# Patient Record
Sex: FEMALE | Race: WHITE | Employment: UNEMPLOYED | URBAN - METROPOLITAN AREA
[De-identification: names, ages, dates, MRNs, and addresses within clinical notes are randomized per-mention and may not be internally consistent; named-entity substitution may affect disease eponyms.]

---

## 2017-05-09 ENCOUNTER — OFFICE VISIT (OUTPATIENT)
Dept: FAMILY MEDICINE CLINIC | Age: 67
End: 2017-05-09

## 2017-05-09 VITALS
TEMPERATURE: 98 F | OXYGEN SATURATION: 99 % | BODY MASS INDEX: 27.82 KG/M2 | HEIGHT: 65 IN | WEIGHT: 167 LBS | DIASTOLIC BLOOD PRESSURE: 76 MMHG | HEART RATE: 79 BPM | RESPIRATION RATE: 14 BRPM | SYSTOLIC BLOOD PRESSURE: 112 MMHG

## 2017-05-09 DIAGNOSIS — J20.9 ACUTE BRONCHITIS, UNSPECIFIED ORGANISM: Primary | ICD-10-CM

## 2017-05-09 PROCEDURE — 99213 OFFICE O/P EST LOW 20 MIN: CPT | Performed by: FAMILY MEDICINE

## 2017-05-09 PROCEDURE — G8427 DOCREV CUR MEDS BY ELIG CLIN: HCPCS | Performed by: FAMILY MEDICINE

## 2017-05-09 PROCEDURE — G8419 CALC BMI OUT NRM PARAM NOF/U: HCPCS | Performed by: FAMILY MEDICINE

## 2017-05-09 PROCEDURE — 3014F SCREEN MAMMO DOC REV: CPT | Performed by: FAMILY MEDICINE

## 2017-05-09 PROCEDURE — 1036F TOBACCO NON-USER: CPT | Performed by: FAMILY MEDICINE

## 2017-05-09 PROCEDURE — 3017F COLORECTAL CA SCREEN DOC REV: CPT | Performed by: FAMILY MEDICINE

## 2017-05-09 RX ORDER — CLARITHROMYCIN 500 MG/1
500 TABLET, COATED ORAL 2 TIMES DAILY
Qty: 14 TABLET | Refills: 0 | Status: SHIPPED | OUTPATIENT
Start: 2017-05-09 | End: 2017-05-16 | Stop reason: ALTCHOICE

## 2017-05-09 RX ORDER — BENZONATATE 100 MG/1
100 CAPSULE ORAL 3 TIMES DAILY PRN
Qty: 30 CAPSULE | Refills: 0 | Status: SHIPPED | OUTPATIENT
Start: 2017-05-09 | End: 2017-05-19

## 2017-05-16 ENCOUNTER — TELEPHONE (OUTPATIENT)
Dept: FAMILY MEDICINE CLINIC | Age: 67
End: 2017-05-16

## 2017-05-16 RX ORDER — MOXIFLOXACIN HYDROCHLORIDE 400 MG/1
400 TABLET ORAL DAILY
Qty: 5 TABLET | Refills: 0 | Status: SHIPPED | OUTPATIENT
Start: 2017-05-16 | End: 2017-05-21

## 2017-10-16 ENCOUNTER — TELEPHONE (OUTPATIENT)
Dept: FAMILY MEDICINE CLINIC | Age: 67
End: 2017-10-16

## 2018-05-07 ENCOUNTER — OFFICE VISIT (OUTPATIENT)
Dept: FAMILY MEDICINE CLINIC | Age: 68
End: 2018-05-07
Payer: MEDICARE

## 2018-05-07 VITALS
WEIGHT: 172 LBS | TEMPERATURE: 97.3 F | BODY MASS INDEX: 28.66 KG/M2 | RESPIRATION RATE: 20 BRPM | DIASTOLIC BLOOD PRESSURE: 78 MMHG | SYSTOLIC BLOOD PRESSURE: 128 MMHG | HEIGHT: 65 IN | HEART RATE: 102 BPM

## 2018-05-07 DIAGNOSIS — Z12.39 SCREENING FOR BREAST CANCER: ICD-10-CM

## 2018-05-07 DIAGNOSIS — B37.9 ANTIBIOTIC-INDUCED YEAST INFECTION: ICD-10-CM

## 2018-05-07 DIAGNOSIS — Z78.0 POSTMENOPAUSAL: ICD-10-CM

## 2018-05-07 DIAGNOSIS — B96.89 ACUTE BACTERIAL SINUSITIS: Primary | ICD-10-CM

## 2018-05-07 DIAGNOSIS — T36.95XA ANTIBIOTIC-INDUCED YEAST INFECTION: ICD-10-CM

## 2018-05-07 DIAGNOSIS — Z23 NEED FOR PNEUMOCOCCAL VACCINATION: ICD-10-CM

## 2018-05-07 DIAGNOSIS — J01.90 ACUTE BACTERIAL SINUSITIS: Primary | ICD-10-CM

## 2018-05-07 PROCEDURE — G8400 PT W/DXA NO RESULTS DOC: HCPCS | Performed by: NURSE PRACTITIONER

## 2018-05-07 PROCEDURE — G8427 DOCREV CUR MEDS BY ELIG CLIN: HCPCS | Performed by: NURSE PRACTITIONER

## 2018-05-07 PROCEDURE — 1123F ACP DISCUSS/DSCN MKR DOCD: CPT | Performed by: NURSE PRACTITIONER

## 2018-05-07 PROCEDURE — 1036F TOBACCO NON-USER: CPT | Performed by: NURSE PRACTITIONER

## 2018-05-07 PROCEDURE — G8417 CALC BMI ABV UP PARAM F/U: HCPCS | Performed by: NURSE PRACTITIONER

## 2018-05-07 PROCEDURE — 4040F PNEUMOC VAC/ADMIN/RCVD: CPT | Performed by: NURSE PRACTITIONER

## 2018-05-07 PROCEDURE — 99213 OFFICE O/P EST LOW 20 MIN: CPT | Performed by: NURSE PRACTITIONER

## 2018-05-07 PROCEDURE — 3017F COLORECTAL CA SCREEN DOC REV: CPT | Performed by: NURSE PRACTITIONER

## 2018-05-07 PROCEDURE — 1090F PRES/ABSN URINE INCON ASSESS: CPT | Performed by: NURSE PRACTITIONER

## 2018-05-07 RX ORDER — AMOXICILLIN AND CLAVULANATE POTASSIUM 875; 125 MG/1; MG/1
1 TABLET, FILM COATED ORAL 2 TIMES DAILY
Qty: 20 TABLET | Refills: 0 | Status: SHIPPED | OUTPATIENT
Start: 2018-05-07 | End: 2018-05-17

## 2018-05-07 RX ORDER — FLUCONAZOLE 100 MG/1
100 TABLET ORAL DAILY
Qty: 2 TABLET | Refills: 0 | Status: SHIPPED | OUTPATIENT
Start: 2018-05-07 | End: 2018-06-05 | Stop reason: ALTCHOICE

## 2018-05-07 ASSESSMENT — ENCOUNTER SYMPTOMS
SINUS PRESSURE: 1
VOMITING: 0
CHEST TIGHTNESS: 0
TROUBLE SWALLOWING: 0
CONSTIPATION: 0
EYE REDNESS: 0
COUGH: 1
RHINORRHEA: 1
EYE PAIN: 0
NAUSEA: 0
EYE DISCHARGE: 0
SORE THROAT: 0
DIARRHEA: 0
WHEEZING: 0
SINUS PAIN: 1
SHORTNESS OF BREATH: 1
EYE ITCHING: 0

## 2018-05-07 ASSESSMENT — PATIENT HEALTH QUESTIONNAIRE - PHQ9
SUM OF ALL RESPONSES TO PHQ QUESTIONS 1-9: 0
SUM OF ALL RESPONSES TO PHQ9 QUESTIONS 1 & 2: 0
1. LITTLE INTEREST OR PLEASURE IN DOING THINGS: 0
2. FEELING DOWN, DEPRESSED OR HOPELESS: 0

## 2018-05-21 ENCOUNTER — HOSPITAL ENCOUNTER (OUTPATIENT)
Dept: WOMENS IMAGING | Age: 68
Discharge: HOME | End: 2018-05-23
Payer: MEDICARE

## 2018-05-21 ENCOUNTER — HOSPITAL ENCOUNTER (OUTPATIENT)
Dept: GENERAL RADIOLOGY | Age: 68
Discharge: HOME | End: 2018-05-23
Payer: MEDICARE

## 2018-05-21 DIAGNOSIS — Z12.39 SCREENING FOR BREAST CANCER: ICD-10-CM

## 2018-05-21 DIAGNOSIS — Z78.0 POSTMENOPAUSAL: ICD-10-CM

## 2018-05-21 PROCEDURE — 77067 SCR MAMMO BI INCL CAD: CPT

## 2018-05-21 PROCEDURE — 77080 DXA BONE DENSITY AXIAL: CPT

## 2018-06-05 ENCOUNTER — OFFICE VISIT (OUTPATIENT)
Dept: FAMILY MEDICINE CLINIC | Age: 68
End: 2018-06-05
Payer: MEDICARE

## 2018-06-05 VITALS
HEIGHT: 65 IN | WEIGHT: 169 LBS | SYSTOLIC BLOOD PRESSURE: 122 MMHG | DIASTOLIC BLOOD PRESSURE: 80 MMHG | HEART RATE: 72 BPM | RESPIRATION RATE: 12 BRPM | TEMPERATURE: 98.2 F | BODY MASS INDEX: 28.16 KG/M2

## 2018-06-05 DIAGNOSIS — Z23 NEED FOR STREPTOCOCCUS PNEUMONIAE VACCINATION: ICD-10-CM

## 2018-06-05 DIAGNOSIS — Z00.00 HEALTHCARE MAINTENANCE: ICD-10-CM

## 2018-06-05 DIAGNOSIS — M81.0 AGE-RELATED OSTEOPOROSIS WITHOUT CURRENT PATHOLOGICAL FRACTURE: Primary | ICD-10-CM

## 2018-06-05 PROCEDURE — G8427 DOCREV CUR MEDS BY ELIG CLIN: HCPCS | Performed by: FAMILY MEDICINE

## 2018-06-05 PROCEDURE — G0009 ADMIN PNEUMOCOCCAL VACCINE: HCPCS | Performed by: FAMILY MEDICINE

## 2018-06-05 PROCEDURE — 99213 OFFICE O/P EST LOW 20 MIN: CPT | Performed by: FAMILY MEDICINE

## 2018-06-05 PROCEDURE — G8399 PT W/DXA RESULTS DOCUMENT: HCPCS | Performed by: FAMILY MEDICINE

## 2018-06-05 PROCEDURE — 1036F TOBACCO NON-USER: CPT | Performed by: FAMILY MEDICINE

## 2018-06-05 PROCEDURE — 90670 PCV13 VACCINE IM: CPT | Performed by: FAMILY MEDICINE

## 2018-06-05 PROCEDURE — 1123F ACP DISCUSS/DSCN MKR DOCD: CPT | Performed by: FAMILY MEDICINE

## 2018-06-05 PROCEDURE — 4040F PNEUMOC VAC/ADMIN/RCVD: CPT | Performed by: FAMILY MEDICINE

## 2018-06-05 PROCEDURE — 1090F PRES/ABSN URINE INCON ASSESS: CPT | Performed by: FAMILY MEDICINE

## 2018-06-05 PROCEDURE — G8417 CALC BMI ABV UP PARAM F/U: HCPCS | Performed by: FAMILY MEDICINE

## 2018-06-05 PROCEDURE — 3017F COLORECTAL CA SCREEN DOC REV: CPT | Performed by: FAMILY MEDICINE

## 2018-06-05 RX ORDER — ZOLEDRONIC ACID 5 MG/100ML
5 INJECTION, SOLUTION INTRAVENOUS ONCE
Qty: 100 ML | Refills: 0 | Status: SHIPPED | OUTPATIENT
Start: 2018-06-05 | End: 2018-07-18 | Stop reason: ALTCHOICE

## 2018-06-16 DIAGNOSIS — Z00.00 HEALTHCARE MAINTENANCE: ICD-10-CM

## 2018-06-16 DIAGNOSIS — D64.9 ANEMIA, UNSPECIFIED TYPE: Primary | ICD-10-CM

## 2018-06-16 DIAGNOSIS — M81.0 AGE-RELATED OSTEOPOROSIS WITHOUT CURRENT PATHOLOGICAL FRACTURE: ICD-10-CM

## 2018-06-16 LAB
ALBUMIN SERPL-MCNC: 4.1 G/DL (ref 3.9–4.9)
ALP BLD-CCNC: 100 U/L (ref 40–130)
ALT SERPL-CCNC: 15 U/L (ref 0–33)
ANION GAP SERPL CALCULATED.3IONS-SCNC: 14 MEQ/L (ref 7–13)
ANISOCYTOSIS: ABNORMAL
AST SERPL-CCNC: 22 U/L (ref 0–35)
BASOPHILS ABSOLUTE: 0 K/UL (ref 0–0.2)
BASOPHILS RELATIVE PERCENT: 1 %
BILIRUB SERPL-MCNC: 0.6 MG/DL (ref 0–1.2)
BUN BLDV-MCNC: 18 MG/DL (ref 8–23)
BURR CELLS: ABNORMAL
CALCIUM SERPL-MCNC: 9.5 MG/DL (ref 8.6–10.2)
CHLORIDE BLD-SCNC: 104 MEQ/L (ref 98–107)
CHOLESTEROL, TOTAL: 198 MG/DL (ref 0–199)
CO2: 26 MEQ/L (ref 22–29)
CREAT SERPL-MCNC: 0.57 MG/DL (ref 0.5–0.9)
EOSINOPHILS ABSOLUTE: 0.3 K/UL (ref 0–0.7)
EOSINOPHILS RELATIVE PERCENT: 8 %
GFR AFRICAN AMERICAN: >60
GFR NON-AFRICAN AMERICAN: >60
GLOBULIN: 2.8 G/DL (ref 2.3–3.5)
GLUCOSE BLD-MCNC: 73 MG/DL (ref 74–109)
HCT VFR BLD CALC: 35.7 % (ref 37–47)
HDLC SERPL-MCNC: 72 MG/DL (ref 40–59)
HEMOGLOBIN: 10.7 G/DL (ref 12–16)
LDL CHOLESTEROL CALCULATED: 113 MG/DL (ref 0–129)
LYMPHOCYTES ABSOLUTE: 1.5 K/UL (ref 1–4.8)
LYMPHOCYTES RELATIVE PERCENT: 43 %
MCH RBC QN AUTO: 24 PG (ref 27–31.3)
MCHC RBC AUTO-ENTMCNC: 30.1 % (ref 33–37)
MCV RBC AUTO: 79.7 FL (ref 82–100)
MICROCYTES: ABNORMAL
MONOCYTES ABSOLUTE: 0.3 K/UL (ref 0.2–0.8)
MONOCYTES RELATIVE PERCENT: 9.3 %
NEUTROPHILS ABSOLUTE: 1.4 K/UL (ref 1.4–6.5)
NEUTROPHILS RELATIVE PERCENT: 39 %
PDW BLD-RTO: 16.4 % (ref 11.5–14.5)
PLATELET # BLD: 192 K/UL (ref 130–400)
POIKILOCYTES: ABNORMAL
POTASSIUM SERPL-SCNC: 4.3 MEQ/L (ref 3.5–5.1)
RBC # BLD: 4.48 M/UL (ref 4.2–5.4)
SODIUM BLD-SCNC: 144 MEQ/L (ref 132–144)
TOTAL PROTEIN: 6.9 G/DL (ref 6.4–8.1)
TRIGL SERPL-MCNC: 67 MG/DL (ref 0–200)
VITAMIN D 25-HYDROXY: 45.1 NG/ML (ref 30–100)
WBC # BLD: 3.5 K/UL (ref 4.8–10.8)

## 2018-06-19 DIAGNOSIS — D64.9 ANEMIA, UNSPECIFIED TYPE: Primary | ICD-10-CM

## 2018-06-20 LAB
BASOPHILS # BLD: 2.1 % (ref 0.1–1.2)
BASOPHILS ABSOLUTE: 0.09 10*3/UL (ref 0.01–0.07)
EOSINOPHIL # BLD: 10 % (ref 0–8.1)
EOSINOPHILS ABSOLUTE: 0.43 10*3/UL (ref 0.04–0.5)
ERYTHROCYTE [DISTWIDTH] IN BLOOD BY AUTOMATED COUNT: 15.2 % (ref 12–15.4)
ERYTHROCYTE [DISTWIDTH] IN BLOOD BY AUTOMATED COUNT: 46.2 FL (ref 39.3–48.6)
FERRITIN: 5 NG/ML (ref 8–150)
FOLATE: 13.4 NG/ML
HCT VFR BLD CALC: 37.2 % (ref 36.5–46.6)
HEMOGLOBIN: 11.3 G/DL (ref 11.8–15.3)
IMMATURE GRANS (ABS): 0 10*3/UL (ref 0–0.21)
IMMATURE GRANULOCYTES: 0 %
IRON SATURATION: 8 % (ref 14–27)
IRON: 34 UG/DL (ref 35–150)
LYMPHOCYTES # BLD: 40.1 % (ref 15.7–50.5)
LYMPHOCYTES ABSOLUTE: 1.73 10*3/UL (ref 0.4–2.84)
MCH RBC QN AUTO: 25.3 PG (ref 27.5–33)
MCHC RBC AUTO-ENTMCNC: 30.4 G/DL (ref 30.1–35)
MCV RBC AUTO: 83.4 FL (ref 85.4–100)
MONOCYTES # BLD: 10 % (ref 4.8–12.7)
MONOCYTES ABSOLUTE: 0.43 10*3/UL (ref 0.25–0.83)
NEUTROPHILS ABSOLUTE: 1.63 10*3/UL (ref 1.95–6.85)
NEUTROPHILS: 37.8 % (ref 36.8–73.2)
NUCLEATED RBCS: 0 /100{WBCS}
PLATELET # BLD: 221 10*3/UL (ref 155–404)
PMV BLD AUTO: 12.1 FL (ref 9.9–12.1)
RBC: 4.46 10*6/UL (ref 3.85–5.1)
RBCS COUNTED: 0 10*3/UL
TOTAL IRON BINDING CAPACITY: 412 UG/DL (ref 250–565)
UNSATURATED IRON BINDING CAPACITY: 378 UG/DL (ref 90–340)
VITAMIN B-12: >1500 PG/ML (ref 211–911)
WBC: 4.3 10*3/UL (ref 4.4–9.9)

## 2018-06-22 LAB — HEMOCCULT STL QL: NEGATIVE

## 2018-07-18 ENCOUNTER — OFFICE VISIT (OUTPATIENT)
Dept: FAMILY MEDICINE CLINIC | Age: 68
End: 2018-07-18
Payer: MEDICARE

## 2018-07-18 ENCOUNTER — HOSPITAL ENCOUNTER (OUTPATIENT)
Dept: INFUSION THERAPY | Age: 68
Setting detail: INFUSION SERIES
Discharge: HOME | End: 2018-07-18
Payer: MEDICARE

## 2018-07-18 VITALS — WEIGHT: 171 LBS | HEIGHT: 65 IN | BODY MASS INDEX: 28.49 KG/M2

## 2018-07-18 VITALS
SYSTOLIC BLOOD PRESSURE: 136 MMHG | HEART RATE: 76 BPM | RESPIRATION RATE: 16 BRPM | TEMPERATURE: 98 F | DIASTOLIC BLOOD PRESSURE: 83 MMHG

## 2018-07-18 DIAGNOSIS — Z00.00 ROUTINE GENERAL MEDICAL EXAMINATION AT A HEALTH CARE FACILITY: Primary | ICD-10-CM

## 2018-07-18 DIAGNOSIS — Z12.11 SCREENING FOR COLON CANCER: ICD-10-CM

## 2018-07-18 DIAGNOSIS — D64.9 CHRONIC ANEMIA: ICD-10-CM

## 2018-07-18 PROCEDURE — 96409 CHEMO IV PUSH SNGL DRUG: CPT

## 2018-07-18 PROCEDURE — G0402 INITIAL PREVENTIVE EXAM: HCPCS | Performed by: FAMILY MEDICINE

## 2018-07-18 PROCEDURE — 4040F PNEUMOC VAC/ADMIN/RCVD: CPT | Performed by: FAMILY MEDICINE

## 2018-07-18 PROCEDURE — 96365 THER/PROPH/DIAG IV INF INIT: CPT

## 2018-07-18 PROCEDURE — 6360000002 HC RX W HCPCS: Performed by: FAMILY MEDICINE

## 2018-07-18 RX ORDER — ZOLEDRONIC ACID 5 MG/100ML
5 INJECTION, SOLUTION INTRAVENOUS ONCE
Status: COMPLETED | OUTPATIENT
Start: 2018-07-18 | End: 2018-07-18

## 2018-07-18 RX ADMIN — ZOLEDRONIC ACID 5 MG: 5 INJECTION INTRAVENOUS at 14:16

## 2018-07-18 ASSESSMENT — LIFESTYLE VARIABLES: HOW OFTEN DO YOU HAVE A DRINK CONTAINING ALCOHOL: 0

## 2018-07-18 ASSESSMENT — VISUAL ACUITY
OD_CC: 20/20
OS_CC: 20/25

## 2018-07-18 ASSESSMENT — PATIENT HEALTH QUESTIONNAIRE - PHQ9: SUM OF ALL RESPONSES TO PHQ QUESTIONS 1-9: 0

## 2018-07-18 ASSESSMENT — ANXIETY QUESTIONNAIRES: GAD7 TOTAL SCORE: 0

## 2018-07-18 NOTE — PROGRESS NOTES
Infusion complete and tolerated well  Left unit walking with spouse  All equipment used in the care for this patient has been cleaned.

## 2018-07-18 NOTE — PATIENT INSTRUCTIONS
Personalized Preventive Plan for Caden Clifford - 7/18/2018  Medicare offers a range of preventive health benefits. Some of the tests and screenings are paid in full while other may be subject to a deductible, co-insurance, and/or copay. Some of these benefits include a comprehensive review of your medical history including lifestyle, illnesses that may run in your family, and various assessments and screenings as appropriate. After reviewing your medical record and screening and assessments performed today your provider may have ordered immunizations, labs, imaging, and/or referrals for you. A list of these orders (if applicable) as well as your Preventive Care list are included within your After Visit Summary for your review. Other Preventive Recommendations:    · A preventive eye exam performed by an eye specialist is recommended every 1-2 years to screen for glaucoma; cataracts, macular degeneration, and other eye disorders. · A preventive dental visit is recommended every 6 months. · Try to get at least 150 minutes of exercise per week or 10,000 steps per day on a pedometer . · Order or download the FREE \"Exercise & Physical Activity: Your Everyday Guide\" from [employer]. Call 6-205.304.7582 or search [employer] online. · You need 6717-5268 mg of calcium and 5637-4330 IU of vitamin D per day. It is possible to meet your calcium requirement with diet alone, but a vitamin D supplement is usually necessary to meet this goal.  · When exposed to the sun, use a sunscreen that protects against both UVA and UVB radiation with an SPF of 30 or greater. Reapply every 2 to 3 hours or after sweating, drying off with a towel, or swimming. · Always wear a seat belt when traveling in a car. Always wear a helmet when riding a bicycle or motorcycle.   Patient Education        Well Visit, Over 72: Care Instructions  Your Care Instructions    Physical exams can help you stay

## 2018-07-18 NOTE — PROGRESS NOTES
Med started and reviewed meds with pt   Understanding verbalized  She refused handout and states I looked it up online and read all the info

## 2018-07-18 NOTE — PROGRESS NOTES
Medicare Annual Wellness Visit  Name: Ree Kim Date: 2018   MRN: [id number] Sex: Female   Age: 74 y.o. Ethnicity: Non-/Non    : 1950 Race: Adam Man is here for Medicare AWV    Screenings for behavioral, psychosocial and functional/safety risks, and cognitive dysfunction are all negative except as indicated below. These results, as well as other patient data from the [address] form, are documented in Flowsheets linked to this Encounter. No Known Allergies  Prior to Visit Medications    Medication Sig Taking? Authorizing Provider   MAGNESIUM PO Take by mouth Yes Historical Provider, MD   Misc. [address] [facility], MD   Vitamin D (CHOLECALCIFEROL) 1000 UNITS CAPS capsule Take 2,000 Units by mouth daily. Yes Historical Provider, MD   Calcium Carbonate-Vit D-Min (CALCIUM 1200 PO) Take  by mouth. Yes Historical Provider, MD   multivitamin [facility]) per tablet Take 1 tablet by mouth daily. Yes Historical Provider, MD   famotidine (PEPCID) 20 MG tablet Take 20 mg by mouth 2 times daily. Yes Historical Provider, MD   Cyanocobalamin (VITAMIN B 12 PO) Take 1 tablet by mouth daily.    Yes Historical Provider, MD     Past Medical History:   Diagnosis Date    Chronic back pain     Disequilibrium     Headache(784.0)     Hematoma of abdominal wall     Hypertension     Low back pain     Malaise and fatigue     Neurodermatitis     OA (osteoarthritis)     Obesity     Obstructive sleep apnea     Osteoporosis 2018    Photodermatitis     Pyelonephritis     RAD (reactive airway disease)     Skin lesion of hand     S/P EXCISION BIOPSY     Vertigo      Past Surgical History:   Procedure Laterality Date    GASTRIC BYPASS SURGERY  2010    HYSTERECTOMY      LAMINECTOMY      lumbar 7 years ago     TONSILLECTOMY AND ADENOIDECTOMY      AS CHILD     Family History   Problem Relation Age of Onset    High

## 2018-07-18 NOTE — FLOWSHEET NOTE
Patient to the floor ambulatory for iv reclast. Vital signs taken. Denies any discomfort. Call light within reach.

## 2018-07-24 DIAGNOSIS — Z12.11 SCREENING FOR COLON CANCER: ICD-10-CM

## 2018-07-24 DIAGNOSIS — Z12.11 COLON CANCER SCREENING: ICD-10-CM

## 2018-07-24 DIAGNOSIS — Z12.11 COLON CANCER SCREENING: Primary | ICD-10-CM

## 2018-07-24 LAB
CONTROL: NORMAL
HEMOCCULT STL QL: NEGATIVE

## 2018-07-24 PROCEDURE — 82274 ASSAY TEST FOR BLOOD FECAL: CPT | Performed by: FAMILY MEDICINE

## 2018-07-25 ENCOUNTER — TELEPHONE (OUTPATIENT)
Dept: FAMILY MEDICINE CLINIC | Age: 68
End: 2018-07-25

## 2019-03-18 ENCOUNTER — TELEPHONE (OUTPATIENT)
Dept: FAMILY MEDICINE CLINIC | Age: 69
End: 2019-03-18

## 2019-03-21 ENCOUNTER — TELEPHONE (OUTPATIENT)
Dept: ADMINISTRATIVE | Age: 69
End: 2019-03-21

## 2019-03-21 ENCOUNTER — TELEPHONE (OUTPATIENT)
Dept: FAMILY MEDICINE CLINIC | Age: 69
End: 2019-03-21

## 2023-02-24 PROBLEM — M54.41 CHRONIC BILATERAL LOW BACK PAIN WITH SCIATICA: Status: ACTIVE | Noted: 2023-02-24

## 2023-02-24 PROBLEM — M21.379 FOOT-DROP: Status: ACTIVE | Noted: 2023-02-24

## 2023-02-24 PROBLEM — R25.2 MUSCLE CRAMPS: Status: ACTIVE | Noted: 2023-02-24

## 2023-02-24 PROBLEM — L25.5 CONTACT DERMATITIS DUE TO PLANT: Status: ACTIVE | Noted: 2023-02-24

## 2023-02-24 PROBLEM — M54.50 CHRONIC BILATERAL LOW BACK PAIN WITHOUT SCIATICA: Status: ACTIVE | Noted: 2023-02-24

## 2023-02-24 PROBLEM — M54.42 CHRONIC BILATERAL LOW BACK PAIN WITH SCIATICA: Status: ACTIVE | Noted: 2023-02-24

## 2023-02-24 PROBLEM — Z98.84 HISTORY OF ROUX-EN-Y GASTRIC BYPASS: Status: ACTIVE | Noted: 2023-02-24

## 2023-02-24 PROBLEM — M54.40 CHRONIC BILATERAL LOW BACK PAIN WITH SCIATICA: Status: ACTIVE | Noted: 2023-02-24

## 2023-02-24 PROBLEM — G89.29 CHRONIC BILATERAL LOW BACK PAIN WITH SCIATICA: Status: ACTIVE | Noted: 2023-02-24

## 2023-02-24 PROBLEM — T14.8XXA NERVE DAMAGE: Status: ACTIVE | Noted: 2023-02-24

## 2023-02-24 PROBLEM — E78.5 DYSLIPIDEMIA: Status: ACTIVE | Noted: 2023-02-24

## 2023-02-24 PROBLEM — M81.0 OSTEOPOROSIS: Status: ACTIVE | Noted: 2023-02-24

## 2023-02-24 PROBLEM — L29.8 PRURITIC ERYTHEMATOUS RASH: Status: ACTIVE | Noted: 2023-02-24

## 2023-02-24 PROBLEM — R26.89 DECREASED MOBILITY: Status: ACTIVE | Noted: 2023-02-24

## 2023-02-24 PROBLEM — G89.29 CHRONIC BILATERAL LOW BACK PAIN WITHOUT SCIATICA: Status: ACTIVE | Noted: 2023-02-24

## 2023-02-24 PROBLEM — L29.89 PRURITIC ERYTHEMATOUS RASH: Status: ACTIVE | Noted: 2023-02-24

## 2023-02-24 PROBLEM — S39.012A: Status: ACTIVE | Noted: 2023-02-24

## 2023-02-24 PROBLEM — D23.61: Status: ACTIVE | Noted: 2023-02-24

## 2023-02-24 PROBLEM — L98.9 SKIN LESION OF RIGHT ARM: Status: ACTIVE | Noted: 2023-02-24

## 2023-02-24 PROBLEM — M81.0 AGE-RELATED OSTEOPOROSIS WITHOUT CURRENT PATHOLOGICAL FRACTURE: Status: ACTIVE | Noted: 2023-02-24

## 2023-02-24 PROBLEM — R53.82 CHRONIC FATIGUE: Status: ACTIVE | Noted: 2023-02-24

## 2023-02-24 PROBLEM — D64.9 ANEMIA: Status: ACTIVE | Noted: 2023-02-24

## 2023-02-24 RX ORDER — MAGNESIUM 250 MG
1 TABLET ORAL DAILY
COMMUNITY

## 2023-02-24 RX ORDER — ACETAMINOPHEN 500 MG
TABLET ORAL
COMMUNITY

## 2023-02-24 RX ORDER — CALCIUM CARBONATE 500(1250)
TABLET ORAL
COMMUNITY

## 2023-02-24 RX ORDER — VITAMIN B COMPLEX
CAPSULE ORAL
COMMUNITY

## 2023-02-24 RX ORDER — ZOLEDRONIC ACID 5 MG/100ML
INJECTION, SOLUTION INTRAVENOUS
COMMUNITY
Start: 2021-07-29 | End: 2023-08-07 | Stop reason: SDUPTHER

## 2023-02-24 RX ORDER — BETAMETHASONE DIPROPIONATE 0.5 MG/G
CREAM TOPICAL
COMMUNITY
End: 2023-11-22 | Stop reason: WASHOUT

## 2023-02-24 RX ORDER — VIT C/ZN GLUC/HERBAL NO.325 90 MG-15MG
LOZENGE MUCOUS MEMBRANE
COMMUNITY

## 2023-03-08 ENCOUNTER — OFFICE VISIT (OUTPATIENT)
Dept: PRIMARY CARE | Facility: CLINIC | Age: 73
End: 2023-03-08
Payer: MEDICARE

## 2023-03-08 VITALS
SYSTOLIC BLOOD PRESSURE: 104 MMHG | HEART RATE: 88 BPM | BODY MASS INDEX: 28.32 KG/M2 | DIASTOLIC BLOOD PRESSURE: 68 MMHG | HEIGHT: 65 IN | TEMPERATURE: 97.2 F | RESPIRATION RATE: 16 BRPM | WEIGHT: 170 LBS

## 2023-03-08 DIAGNOSIS — Z12.31 ENCOUNTER FOR SCREENING MAMMOGRAM FOR BREAST CANCER: ICD-10-CM

## 2023-03-08 DIAGNOSIS — D64.9 ANEMIA, UNSPECIFIED TYPE: ICD-10-CM

## 2023-03-08 DIAGNOSIS — M54.16 LUMBAR RADICULOPATHY: ICD-10-CM

## 2023-03-08 DIAGNOSIS — Z78.0 POST-MENOPAUSAL: ICD-10-CM

## 2023-03-08 DIAGNOSIS — M47.16 LUMBAR SPONDYLOSIS WITH MYELOPATHY: ICD-10-CM

## 2023-03-08 DIAGNOSIS — M48.061 LUMBAR FORAMINAL STENOSIS: Primary | ICD-10-CM

## 2023-03-08 DIAGNOSIS — R29.898 WEAKNESS OF RIGHT LOWER EXTREMITY: ICD-10-CM

## 2023-03-08 PROCEDURE — 1036F TOBACCO NON-USER: CPT | Performed by: FAMILY MEDICINE

## 2023-03-08 PROCEDURE — 1159F MED LIST DOCD IN RCRD: CPT | Performed by: FAMILY MEDICINE

## 2023-03-08 PROCEDURE — 99213 OFFICE O/P EST LOW 20 MIN: CPT | Performed by: FAMILY MEDICINE

## 2023-03-08 PROCEDURE — 1160F RVW MEDS BY RX/DR IN RCRD: CPT | Performed by: FAMILY MEDICINE

## 2023-03-08 ASSESSMENT — ENCOUNTER SYMPTOMS
TINGLING: 1
PARESTHESIAS: 1
DYSURIA: 0
WEIGHT LOSS: 0
HEADACHES: 0
PERIANAL NUMBNESS: 0
ABDOMINAL PAIN: 0
BOWEL INCONTINENCE: 0
WEAKNESS: 1
LEG PAIN: 1
PARESIS: 1
FEVER: 0
NUMBNESS: 1
BACK PAIN: 1

## 2023-03-08 NOTE — PROGRESS NOTES
Subjective   Patient ID: Rachel Mann is a 72 y.o. female who presents for Back Pain (Follow up on low back pain and results of MRI of her Lumbar Spine).     Back Pain  This is a chronic problem. The current episode started more than 1 year ago. The problem occurs constantly. The problem has been gradually worsening since onset. The pain is present in the lumbar spine. The quality of the pain is described as aching, burning, cramping, shooting and stabbing. The pain radiates to the left knee, left foot, left thigh, right knee, right foot and right thigh. The pain is at a severity of 6/10. The pain is moderate. The pain is The same all the time. The symptoms are aggravated by bending, lying down, position, sitting, standing, stress and twisting. Stiffness is present All day. Associated symptoms include bladder incontinence, leg pain, numbness, paresis, paresthesias, pelvic pain, tingling and weakness. Pertinent negatives include no abdominal pain, bowel incontinence, chest pain, dysuria, fever, headaches, perianal numbness or weight loss. Risk factors include menopause and obesity. She has tried NSAIDs, heat, ice, home exercises, muscle relaxant, walking, bed rest and analgesics for the symptoms. The treatment provided no relief.      Review of system  General no fever, chills, appetite unchanged, no change in weight and no undue fatigue.  ENT no runny nose, nasal congestion, sinus pressure, no sore throat, pain with swallowing, earache, loss of taste, tinnitus, ear discharge  Eyes no eye redness, irritation, eye pain, double vision, blurred vision.  Skin no rash, itching, change in skin color and no lumps  Respiratory no cough, no hemoptysis, shortness of breath, wheezing or pleuritic pain.  Cardiovascular no chest pain, palpitations, orthopnea, PND, presyncope, near-syncope or dizziness.  No leg swelling and no exertional chest pain.  Gastrointestinal no nausea or vomiting, no abdominal pain, abdominal distention,  "hematemesis, constipation or diarrhea.  No hematochezia and no melena stool.  Neurologic no confusion, headache, blurred vision, dizziness, vertigo or slow speech.  Psychiatric no Depression, anxiety, sleep difficulty, dysphoria and no changes in the mood.      Objective   /68   Pulse 88   Temp 36.2 °C (97.2 °F)   Resp 16   Ht 1.651 m (5' 5\")   Wt 77.1 kg (170 lb)   BMI 28.29 kg/m²     Exam     General well-appearing well-nourished, not in apparent distress.  HEENT EOMI, PEERLA, oropharynx no erythema or exudate.  No mouth lesions.  External auditory canal normal bilaterally.  Tympanic membranes normal bilaterally.  Skin warm, no rash and no lesions  Neck supple, trachea central, no thyromegaly.  No cervical adenopathy.  Lungs good air entry bilaterally without rhonchi or rales and no wheezes.  Cardiovascular regular rate and rhythm, no murmurs gallop or rub.  Point of maximal impulse at fifth left intercostal space midclavicular line  Abdomen nondistended, soft, no tenderness, no palpable abdominal mass, liver and spleen not enlarged,   no CVA tenderness, bowel sounds normal.  Extremities no cyanosis clubbing or edema  Musculoskeletal revealed right paraspinal tenderness.  Has limitation of forward flexion of the spine.  Straight leg raising was diminished bilaterally.  Muscle strength was decreased to 4/5 in the left lower extremities and 4-5 out of 5 in the right lower extremity.  Sensation was diminished along L3-L4 dermatome.       Assessment/Plan   Diagnoses and all orders for this visit:  Lumbar foraminal stenosis  -     Referral to Spine Surgery; Future  Lumbar spondylosis with myelopathy  Post-menopausal  -     XR DEXA bone density; Future  Encounter for screening mammogram for breast cancer  -     BI mammo bilateral screening tomosynthesis; Future  Lumbar radiculopathy  -     Referral to Spine Surgery; Future  Weakness of right lower extremity  -     Referral to Spine Surgery; Future  Anemia, " unspecified type  -     CBC and Auto Differential; Future  -     Ferritin; Future  -     Iron and TIBC; Future  -     Follow Up In [facility] - PCP; Future       Scribe Attestation  By signing my name below, I, Samantha Clark   attest that this documentation has been prepared under the direction and in the presence of Mat Tatum MD.

## 2023-03-09 ENCOUNTER — LAB (OUTPATIENT)
Dept: LAB | Facility: LAB | Age: 73
End: 2023-03-09
Payer: MEDICARE

## 2023-03-09 DIAGNOSIS — D64.9 ANEMIA, UNSPECIFIED TYPE: ICD-10-CM

## 2023-03-09 LAB
BASOPHILS (10*3/UL) IN BLOOD BY AUTOMATED COUNT: 0.11 X10E9/L (ref 0–0.1)
BASOPHILS/100 LEUKOCYTES IN BLOOD BY AUTOMATED COUNT: 2 % (ref 0–2)
EOSINOPHILS (10*3/UL) IN BLOOD BY AUTOMATED COUNT: 0.45 X10E9/L (ref 0–0.7)
EOSINOPHILS/100 LEUKOCYTES IN BLOOD BY AUTOMATED COUNT: 8.2 % (ref 0–6)
ERYTHROCYTE DISTRIBUTION WIDTH (RATIO) BY AUTOMATED COUNT: 15.5 % (ref 11.5–14.5)
ERYTHROCYTE MEAN CORPUSCULAR HEMOGLOBIN CONCENTRATION (G/DL) BY AUTOMATED: 30 G/DL (ref 32–36)
ERYTHROCYTE MEAN CORPUSCULAR VOLUME (FL) BY AUTOMATED COUNT: 90 FL (ref 80–100)
ERYTHROCYTES (10*6/UL) IN BLOOD BY AUTOMATED COUNT: 4.61 X10E12/L (ref 4–5.2)
HEMATOCRIT (%) IN BLOOD BY AUTOMATED COUNT: 41.3 % (ref 36–46)
HEMOGLOBIN (G/DL) IN BLOOD: 12.4 G/DL (ref 12–16)
IMMATURE GRANULOCYTES/100 LEUKOCYTES IN BLOOD BY AUTOMATED COUNT: 0.2 % (ref 0–0.9)
IRON (UG/DL) IN SER/PLAS: 62 UG/DL (ref 35–150)
IRON BINDING CAPACITY (UG/DL) IN SER/PLAS: 431 UG/DL (ref 240–445)
IRON SATURATION (%) IN SER/PLAS: 14 % (ref 25–45)
LEUKOCYTES (10*3/UL) IN BLOOD BY AUTOMATED COUNT: 5.5 X10E9/L (ref 4.4–11.3)
LYMPHOCYTES (10*3/UL) IN BLOOD BY AUTOMATED COUNT: 2.01 X10E9/L (ref 1.2–4.8)
LYMPHOCYTES/100 LEUKOCYTES IN BLOOD BY AUTOMATED COUNT: 36.5 % (ref 13–44)
MONOCYTES (10*3/UL) IN BLOOD BY AUTOMATED COUNT: 0.59 X10E9/L (ref 0.1–1)
MONOCYTES/100 LEUKOCYTES IN BLOOD BY AUTOMATED COUNT: 10.7 % (ref 2–10)
NEUTROPHILS (10*3/UL) IN BLOOD BY AUTOMATED COUNT: 2.33 X10E9/L (ref 1.2–7.7)
NEUTROPHILS/100 LEUKOCYTES IN BLOOD BY AUTOMATED COUNT: 42.4 % (ref 40–80)
PLATELETS (10*3/UL) IN BLOOD AUTOMATED COUNT: 135 X10E9/L (ref 150–450)

## 2023-03-09 PROCEDURE — 83540 ASSAY OF IRON: CPT

## 2023-03-09 PROCEDURE — 85025 COMPLETE CBC W/AUTO DIFF WBC: CPT

## 2023-03-09 PROCEDURE — 36415 COLL VENOUS BLD VENIPUNCTURE: CPT

## 2023-03-09 PROCEDURE — 82728 ASSAY OF FERRITIN: CPT

## 2023-03-09 PROCEDURE — 83550 IRON BINDING TEST: CPT

## 2023-03-10 LAB — FERRITIN (UG/LL) IN SER/PLAS: 11 UG/L (ref 8–150)

## 2023-03-15 DIAGNOSIS — M47.16 LUMBAR SPONDYLOSIS WITH MYELOPATHY: ICD-10-CM

## 2023-03-15 DIAGNOSIS — R20.0 NUMBNESS AND TINGLING OF RIGHT LEG: ICD-10-CM

## 2023-03-15 DIAGNOSIS — R20.2 NUMBNESS AND TINGLING OF RIGHT LEG: ICD-10-CM

## 2023-03-15 DIAGNOSIS — R29.898 WEAKNESS OF BOTH LOWER EXTREMITIES: ICD-10-CM

## 2023-03-15 DIAGNOSIS — R29.898 WEAKNESS OF RIGHT LOWER EXTREMITY: ICD-10-CM

## 2023-03-15 DIAGNOSIS — M54.16 LUMBAR RADICULOPATHY: ICD-10-CM

## 2023-03-21 ENCOUNTER — TELEPHONE (OUTPATIENT)
Dept: PRIMARY CARE | Facility: CLINIC | Age: 73
End: 2023-03-21
Payer: MEDICARE

## 2023-03-21 NOTE — TELEPHONE ENCOUNTER
Per Dr. Tatum cancel the appointment and reschedule as we will need the result for the appointment and let patient know if we don't call her a week after the EMG to call our office     Patient aware, cancelled and rescheduled

## 2023-03-21 NOTE — TELEPHONE ENCOUNTER
Rachel called in to see if she needed to keep her appt for 3/28 because she is having her EKG done at 2:30pm that day and she is unsure if he would want to see her before she gets it done. If he wants her to keep it she will but if not she would like to cancel.    Please advise.

## 2023-03-28 ENCOUNTER — APPOINTMENT (OUTPATIENT)
Dept: PRIMARY CARE | Facility: CLINIC | Age: 73
End: 2023-03-28
Payer: MEDICARE

## 2023-04-11 ENCOUNTER — OFFICE VISIT (OUTPATIENT)
Dept: PRIMARY CARE | Facility: CLINIC | Age: 73
End: 2023-04-11
Payer: MEDICARE

## 2023-04-11 VITALS
WEIGHT: 174 LBS | SYSTOLIC BLOOD PRESSURE: 124 MMHG | RESPIRATION RATE: 16 BRPM | HEIGHT: 65 IN | DIASTOLIC BLOOD PRESSURE: 76 MMHG | HEART RATE: 76 BPM | BODY MASS INDEX: 28.99 KG/M2 | TEMPERATURE: 97.5 F | OXYGEN SATURATION: 97 %

## 2023-04-11 DIAGNOSIS — R29.898 WEAKNESS OF RIGHT LOWER EXTREMITY: ICD-10-CM

## 2023-04-11 DIAGNOSIS — G57.30 ENTRAPMENT NEUROPATHY OF COMMON PERONEAL NERVE: Primary | ICD-10-CM

## 2023-04-11 DIAGNOSIS — R73.9 HYPERGLYCEMIA: ICD-10-CM

## 2023-04-11 DIAGNOSIS — G60.9 PERIPHERAL NEUROPATHY, IDIOPATHIC: ICD-10-CM

## 2023-04-11 DIAGNOSIS — M54.16 LUMBAR RADICULOPATHY: ICD-10-CM

## 2023-04-11 PROBLEM — M47.16 LUMBAR SPONDYLOSIS WITH MYELOPATHY: Status: ACTIVE | Noted: 2023-04-11

## 2023-04-11 PROCEDURE — 99213 OFFICE O/P EST LOW 20 MIN: CPT | Performed by: FAMILY MEDICINE

## 2023-04-11 PROCEDURE — 1159F MED LIST DOCD IN RCRD: CPT | Performed by: FAMILY MEDICINE

## 2023-04-11 PROCEDURE — 1036F TOBACCO NON-USER: CPT | Performed by: FAMILY MEDICINE

## 2023-04-11 PROCEDURE — 1160F RVW MEDS BY RX/DR IN RCRD: CPT | Performed by: FAMILY MEDICINE

## 2023-04-11 ASSESSMENT — ENCOUNTER SYMPTOMS
OCCASIONAL FEELINGS OF UNSTEADINESS: 1
DEPRESSION: 0
LOSS OF SENSATION IN FEET: 0

## 2023-04-11 ASSESSMENT — PATIENT HEALTH QUESTIONNAIRE - PHQ9
2. FEELING DOWN, DEPRESSED OR HOPELESS: NOT AT ALL
SUM OF ALL RESPONSES TO PHQ9 QUESTIONS 1 AND 2: 0
1. LITTLE INTEREST OR PLEASURE IN DOING THINGS: NOT AT ALL

## 2023-04-11 NOTE — PROGRESS NOTES
"Chief Complaint   Patient presents with    Follow-up     On EMG results and Peripheral Neuropathy     She presents today for follow up on neuropathy. She describes symptoms of numbness and tingling. Onset of symptoms was sudden, starting about a few months ago. Symptoms are currently of moderate severity. Symptoms occur all day  Symptoms are worse in the right lower extremity.     General ROS: negative for - chills, fatigue, fever, malaise, night sweats, weight gain, or weight loss  Ophthalmic ROS: negative for - blurry vision, double vision, dry eyes, eye pain, itchy eyes, loss of vision, photophobia, or scotomata  ENT ROS: negative for - epistaxis, hearing change, nasal congestion, nasal discharge, sneezing, sore throat, tinnitus, or vocal changes  Respiratory ROS: negative for - cough, hemoptysis, shortness of breath, or wheezing  Cardiovascular ROS: negative for - chest pain, dyspnea on exertion, irregular heartbeat, loss of consciousness, orthopnea,   palpitations, paroxysmal nocturnal dyspnea, or rapid heart rate  Gastrointestinal ROS: negative for - abdominal pain, appetite loss, blood in stools, constipation, diarrhea, hematemesis, melena, or nausea/vomiting  Genitourinary ROS: negative for - dysuria, hematuria, nocturia, or urinary frequency/urgency  Dermatological ROS: negative for dry skin, pruritus, rash, and skin lesion changes    Objective   /76   Pulse 76   Temp 36.4 °C (97.5 °F)   Resp 16   Ht 1.651 m (5' 5\")   Wt 78.9 kg (174 lb)   SpO2 97%   BMI 28.96 kg/m²     Physical examination  General Appearance: awake, alert, oriented, in no acute distress  Eyes: PERRL, EOMI, and Sclera nonicteric  Ears: canals and TMs NI  Nose/Sinuses: negative  Mouth/Throat: Mucosa moist, no lesions; pharynx without erythema, edema or exudate.  Neck: neck- supple, no mass, non-tender  Lungs: Normal expansion.  Clear to auscultation.  No rales, rhonchi, or wheezing.  Heart: Heart sounds are normal.  Regular " rate and rhythm without murmur, gallop or rub.  Abdomen: Soft, non-tender, normal bowel sounds; no bruits, organomegaly or masses.  Extremities: Extremities warm to touch, pink, with no edema.  Skin: skin color, texture, turgor are normal; there are no bruises, rashes or lesions.  Peripheral Pulses: Normal    === 03/30/23 ===    MR FOOT    - Impression -  9 mm x 5 mm small lesion of the soft tissues of the right forefoot as  above. The imaging characteristics favor cystic lesion. No solid  component seen. The lesion can not be differentiated from the  cutaneous surface and could potentially be dermatologic in nature.    Other degenerative changes as above.    EMG result reviewed with the patient which showed lumbar radiculopathy, left, peroneal nerve compression at the knee over the fibula and evidence of peripheral neuropathy.    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    Relevant Orders    Referral to Neurology    Hyperglycemia    Relevant Orders    Hemoglobin A1C    Basic metabolic panel    Lumbar radiculopathy    Relevant Orders    TSH with reflex to Free T4 if abnormal    Vitamin B12    Vitamin B6    Referral to Neurology    Peripheral neuropathy, idiopathic    Relevant Orders    TSH with reflex to Free T4 if abnormal    Vitamin B12    Vitamin B6    Referral to Neurology    Weakness of right lower extremity    Relevant Orders    Referral to Neurology     Scribe Attestation  By signing my name below, IDavid Scribe   attest that this documentation has been prepared under the direction and in the presence of Mat Tatum MD.    Patient was identified as a fall risk. Risk prevention instructions provided.

## 2023-04-11 NOTE — PATIENT INSTRUCTIONS

## 2023-04-12 ENCOUNTER — LAB (OUTPATIENT)
Dept: LAB | Facility: LAB | Age: 73
End: 2023-04-12
Payer: MEDICARE

## 2023-04-12 DIAGNOSIS — R73.9 HYPERGLYCEMIA: ICD-10-CM

## 2023-04-12 DIAGNOSIS — G60.9 PERIPHERAL NEUROPATHY, IDIOPATHIC: ICD-10-CM

## 2023-04-12 DIAGNOSIS — M54.16 LUMBAR RADICULOPATHY: ICD-10-CM

## 2023-04-12 LAB
ANION GAP IN SER/PLAS: 11 MMOL/L (ref 10–20)
CALCIUM (MG/DL) IN SER/PLAS: 9 MG/DL (ref 8.6–10.3)
CARBON DIOXIDE, TOTAL (MMOL/L) IN SER/PLAS: 26 MMOL/L (ref 21–32)
CHLORIDE (MMOL/L) IN SER/PLAS: 106 MMOL/L (ref 98–107)
COBALAMIN (VITAMIN B12) (PG/ML) IN SER/PLAS: 2803 PG/ML (ref 211–911)
CREATININE (MG/DL) IN SER/PLAS: 0.72 MG/DL (ref 0.5–1.05)
ESTIMATED AVERAGE GLUCOSE FOR HBA1C: 111 MG/DL
GFR FEMALE: 90 ML/MIN/1.73M2
GLUCOSE (MG/DL) IN SER/PLAS: 65 MG/DL (ref 74–99)
HEMOGLOBIN A1C/HEMOGLOBIN TOTAL IN BLOOD: 5.5 %
POTASSIUM (MMOL/L) IN SER/PLAS: 4.4 MMOL/L (ref 3.5–5.3)
SODIUM (MMOL/L) IN SER/PLAS: 139 MMOL/L (ref 136–145)
THYROTROPIN (MIU/L) IN SER/PLAS BY DETECTION LIMIT <= 0.05 MIU/L: 1.64 MIU/L (ref 0.44–3.98)
UREA NITROGEN (MG/DL) IN SER/PLAS: 23 MG/DL (ref 6–23)

## 2023-04-12 PROCEDURE — 84443 ASSAY THYROID STIM HORMONE: CPT

## 2023-04-12 PROCEDURE — 82607 VITAMIN B-12: CPT

## 2023-04-12 PROCEDURE — 36415 COLL VENOUS BLD VENIPUNCTURE: CPT

## 2023-04-12 PROCEDURE — 80048 BASIC METABOLIC PNL TOTAL CA: CPT

## 2023-04-12 PROCEDURE — 84207 ASSAY OF VITAMIN B-6: CPT

## 2023-04-12 PROCEDURE — 83036 HEMOGLOBIN GLYCOSYLATED A1C: CPT

## 2023-04-17 LAB — VITAMIN B6: 76.1 NMOL/L (ref 20–125)

## 2023-05-05 LAB
FOLATE (NG/ML) IN SER/PLAS: >22.3 NG/ML
FOLATE: >22.3 NG/ML

## 2023-05-09 LAB
ALBUMIN ELP: 4 G/DL (ref 3.4–5)
ALBUMIN SERPL-MCNC: 4 G/DL (ref 3.4–5)
ALPHA 1: 0.3 G/DL (ref 0.2–0.6)
ALPHA 1: 0.3 G/DL (ref 0.2–0.6)
ALPHA 2: 0.7 G/DL (ref 0.4–1.1)
ALPHA 2: 0.7 G/DL (ref 0.4–1.1)
BETA: 0.9 G/DL (ref 0.5–1.2)
BETA: 0.9 G/DL (ref 0.5–1.2)
GAMMA GLOBULIN: 1.1 G/DL (ref 0.5–1.4)
GAMMA: 1.1 G/DL (ref 0.5–1.4)
INTERPRETATION: NORMAL
PATH REVIEW-SERUM PROTEIN ELECTROPHORESIS: NORMAL
PATHOLOGIST REVIEW: NORMAL
PROTEIN ELECTROPHORESIS INTERPRETATION: NORMAL
PROTEIN TOTAL: 7 G/DL (ref 6.4–8.2)
TOTAL PROTEIN: 7 G/DL (ref 6.4–8.2)

## 2023-07-10 ENCOUNTER — APPOINTMENT (OUTPATIENT)
Dept: PRIMARY CARE | Facility: CLINIC | Age: 73
End: 2023-07-10
Payer: MEDICARE

## 2023-07-31 DIAGNOSIS — R53.82 CHRONIC FATIGUE: ICD-10-CM

## 2023-07-31 DIAGNOSIS — E78.5 DYSLIPIDEMIA: Primary | ICD-10-CM

## 2023-07-31 DIAGNOSIS — D50.9 IRON DEFICIENCY ANEMIA, UNSPECIFIED IRON DEFICIENCY ANEMIA TYPE: ICD-10-CM

## 2023-08-01 ENCOUNTER — LAB (OUTPATIENT)
Dept: LAB | Facility: LAB | Age: 73
End: 2023-08-01
Payer: MEDICARE

## 2023-08-01 DIAGNOSIS — D50.9 IRON DEFICIENCY ANEMIA, UNSPECIFIED IRON DEFICIENCY ANEMIA TYPE: ICD-10-CM

## 2023-08-01 DIAGNOSIS — R53.82 CHRONIC FATIGUE: ICD-10-CM

## 2023-08-01 DIAGNOSIS — E78.5 DYSLIPIDEMIA: ICD-10-CM

## 2023-08-01 LAB
ALANINE AMINOTRANSFERASE (SGPT) (U/L) IN SER/PLAS: 11 U/L (ref 7–45)
ALBUMIN (G/DL) IN SER/PLAS: 4 G/DL (ref 3.4–5)
ALKALINE PHOSPHATASE (U/L) IN SER/PLAS: 64 U/L (ref 33–136)
ANION GAP IN SER/PLAS: 12 MMOL/L (ref 10–20)
ASPARTATE AMINOTRANSFERASE (SGOT) (U/L) IN SER/PLAS: 17 U/L (ref 9–39)
BASOPHILS (10*3/UL) IN BLOOD BY AUTOMATED COUNT: 0.11 X10E9/L (ref 0–0.1)
BASOPHILS/100 LEUKOCYTES IN BLOOD BY AUTOMATED COUNT: 2.7 % (ref 0–2)
BILIRUBIN TOTAL (MG/DL) IN SER/PLAS: 0.7 MG/DL (ref 0–1.2)
CALCIUM (MG/DL) IN SER/PLAS: 9.4 MG/DL (ref 8.6–10.3)
CARBON DIOXIDE, TOTAL (MMOL/L) IN SER/PLAS: 29 MMOL/L (ref 21–32)
CHLORIDE (MMOL/L) IN SER/PLAS: 107 MMOL/L (ref 98–107)
CHOLESTEROL (MG/DL) IN SER/PLAS: 191 MG/DL (ref 0–199)
CHOLESTEROL IN HDL (MG/DL) IN SER/PLAS: 65.1 MG/DL
CHOLESTEROL/HDL RATIO: 2.9
CREATININE (MG/DL) IN SER/PLAS: 0.63 MG/DL (ref 0.5–1.05)
EOSINOPHILS (10*3/UL) IN BLOOD BY AUTOMATED COUNT: 0.29 X10E9/L (ref 0–0.7)
EOSINOPHILS/100 LEUKOCYTES IN BLOOD BY AUTOMATED COUNT: 7.2 % (ref 0–6)
ERYTHROCYTE DISTRIBUTION WIDTH (RATIO) BY AUTOMATED COUNT: 14.5 % (ref 11.5–14.5)
ERYTHROCYTE MEAN CORPUSCULAR HEMOGLOBIN CONCENTRATION (G/DL) BY AUTOMATED: 31.1 G/DL (ref 32–36)
ERYTHROCYTE MEAN CORPUSCULAR VOLUME (FL) BY AUTOMATED COUNT: 89 FL (ref 80–100)
ERYTHROCYTES (10*6/UL) IN BLOOD BY AUTOMATED COUNT: 4.36 X10E12/L (ref 4–5.2)
GFR FEMALE: >90 ML/MIN/1.73M2
GLUCOSE (MG/DL) IN SER/PLAS: 85 MG/DL (ref 74–99)
HEMATOCRIT (%) IN BLOOD BY AUTOMATED COUNT: 38.6 % (ref 36–46)
HEMOGLOBIN (G/DL) IN BLOOD: 12 G/DL (ref 12–16)
IMMATURE GRANULOCYTES/100 LEUKOCYTES IN BLOOD BY AUTOMATED COUNT: 0.2 % (ref 0–0.9)
LDL: 112 MG/DL (ref 0–99)
LEUKOCYTES (10*3/UL) IN BLOOD BY AUTOMATED COUNT: 4 X10E9/L (ref 4.4–11.3)
LYMPHOCYTES (10*3/UL) IN BLOOD BY AUTOMATED COUNT: 1.68 X10E9/L (ref 1.2–4.8)
LYMPHOCYTES/100 LEUKOCYTES IN BLOOD BY AUTOMATED COUNT: 41.8 % (ref 13–44)
MONOCYTES (10*3/UL) IN BLOOD BY AUTOMATED COUNT: 0.44 X10E9/L (ref 0.1–1)
MONOCYTES/100 LEUKOCYTES IN BLOOD BY AUTOMATED COUNT: 10.9 % (ref 2–10)
NEUTROPHILS (10*3/UL) IN BLOOD BY AUTOMATED COUNT: 1.49 X10E9/L (ref 1.2–7.7)
NEUTROPHILS/100 LEUKOCYTES IN BLOOD BY AUTOMATED COUNT: 37.2 % (ref 40–80)
PLATELETS (10*3/UL) IN BLOOD AUTOMATED COUNT: 142 X10E9/L (ref 150–450)
POTASSIUM (MMOL/L) IN SER/PLAS: 4.6 MMOL/L (ref 3.5–5.3)
PROTEIN TOTAL: 6.4 G/DL (ref 6.4–8.2)
SODIUM (MMOL/L) IN SER/PLAS: 143 MMOL/L (ref 136–145)
TRIGLYCERIDE (MG/DL) IN SER/PLAS: 71 MG/DL (ref 0–149)
UREA NITROGEN (MG/DL) IN SER/PLAS: 19 MG/DL (ref 6–23)
VLDL: 14 MG/DL (ref 0–40)

## 2023-08-01 PROCEDURE — 85025 COMPLETE CBC W/AUTO DIFF WBC: CPT

## 2023-08-01 PROCEDURE — 36415 COLL VENOUS BLD VENIPUNCTURE: CPT

## 2023-08-01 PROCEDURE — 80053 COMPREHEN METABOLIC PANEL: CPT

## 2023-08-01 PROCEDURE — 80061 LIPID PANEL: CPT

## 2023-08-07 ENCOUNTER — OFFICE VISIT (OUTPATIENT)
Dept: PRIMARY CARE | Facility: CLINIC | Age: 73
End: 2023-08-07
Payer: MEDICARE

## 2023-08-07 VITALS
HEART RATE: 74 BPM | DIASTOLIC BLOOD PRESSURE: 70 MMHG | WEIGHT: 175 LBS | HEIGHT: 65 IN | TEMPERATURE: 97.3 F | RESPIRATION RATE: 13 BRPM | SYSTOLIC BLOOD PRESSURE: 112 MMHG | BODY MASS INDEX: 29.16 KG/M2 | OXYGEN SATURATION: 98 %

## 2023-08-07 DIAGNOSIS — D64.9 ANEMIA, UNSPECIFIED TYPE: ICD-10-CM

## 2023-08-07 DIAGNOSIS — Z23 NEED FOR PNEUMOCOCCAL VACCINE: ICD-10-CM

## 2023-08-07 DIAGNOSIS — M81.0 AGE-RELATED OSTEOPOROSIS WITHOUT CURRENT PATHOLOGICAL FRACTURE: ICD-10-CM

## 2023-08-07 DIAGNOSIS — Z00.00 ROUTINE GENERAL MEDICAL EXAMINATION AT A HEALTH CARE FACILITY: Primary | ICD-10-CM

## 2023-08-07 DIAGNOSIS — G60.9 PERIPHERAL NEUROPATHY, IDIOPATHIC: ICD-10-CM

## 2023-08-07 PROCEDURE — 99213 OFFICE O/P EST LOW 20 MIN: CPT | Performed by: FAMILY MEDICINE

## 2023-08-07 PROCEDURE — 1160F RVW MEDS BY RX/DR IN RCRD: CPT | Performed by: FAMILY MEDICINE

## 2023-08-07 PROCEDURE — 90677 PCV20 VACCINE IM: CPT | Performed by: FAMILY MEDICINE

## 2023-08-07 PROCEDURE — G0009 ADMIN PNEUMOCOCCAL VACCINE: HCPCS | Performed by: FAMILY MEDICINE

## 2023-08-07 PROCEDURE — G0439 PPPS, SUBSEQ VISIT: HCPCS | Performed by: FAMILY MEDICINE

## 2023-08-07 PROCEDURE — 1036F TOBACCO NON-USER: CPT | Performed by: FAMILY MEDICINE

## 2023-08-07 PROCEDURE — 1125F AMNT PAIN NOTED PAIN PRSNT: CPT | Performed by: FAMILY MEDICINE

## 2023-08-07 PROCEDURE — 1170F FXNL STATUS ASSESSED: CPT | Performed by: FAMILY MEDICINE

## 2023-08-07 PROCEDURE — 1159F MED LIST DOCD IN RCRD: CPT | Performed by: FAMILY MEDICINE

## 2023-08-07 RX ORDER — ZOLEDRONIC ACID 5 MG/100ML
INJECTION, SOLUTION INTRAVENOUS
Qty: 100 ML | Refills: 0 | Status: SHIPPED | OUTPATIENT
Start: 2023-08-07

## 2023-08-07 ASSESSMENT — ACTIVITIES OF DAILY LIVING (ADL)
BATHING: INDEPENDENT
DRESSING: INDEPENDENT
DOING_HOUSEWORK: INDEPENDENT
TAKING_MEDICATION: INDEPENDENT
GROCERY_SHOPPING: INDEPENDENT
MANAGING_FINANCES: INDEPENDENT

## 2023-08-07 ASSESSMENT — ENCOUNTER SYMPTOMS
LOSS OF SENSATION IN FEET: 1
DEPRESSION: 0
OCCASIONAL FEELINGS OF UNSTEADINESS: 1

## 2023-08-07 NOTE — PROGRESS NOTES
Chief Complaint   Patient presents with    Medicare Annual Wellness Visit Initial    Follow-up     Lab results     Rachel Mann is a 72 y.o. female here for Annual Medicare Wellness Visit and follow up on lab results.    She presents today for follow up on neuropathy. She describes symptoms of numbness and tingling. Onset of symptoms was sudden, starting several months ago. Symptoms are currently of moderate severity. Symptoms occur all day. Symptoms are worse in the right lower extremity.      Past Medical, Surgical, and Family History reviewed and updated in chart.    Reviewed all medications by prescribing practitioner or clinical pharmacist (such as prescriptions, OTCs, herbal therapies and supplements) and documented in the medical record.    Patient Self Assessment of Health Status  Patient Self Assessment: Good    Nutrition and Exercise  Current Diet: Well Balanced Diet  Adequate Fluid Intake: Yes  Caffeine: Yes  Exercise Frequency: Infrequently    Functional Ability/Level of Safety  Cognitive Impairment Observed: No cognitive impairment observed  Cognitive Impairment Reported: No cognitive impairment reported by patient or family    Home Safety Risk Factors: None    Review of Systems - General ROS: negative for - fatigue, fever, malaise, night sweats, sleep disturbance or weight loss  Psychological ROS: negative for - anxiety, concentration difficulties, depression, memory difficulties or sleep disturbances  ENT ROS: negative for - hearing change, nasal discharge, oral lesions, sinus pain, sore throat, tinnitus or vertigo  Allergy and Immunology ROS: negative for - hives, nasal congestion or seasonal allergies  Hematological and Lymphatic ROS: negative for - bruising, fatigue, night sweats or pallor  Endocrine ROS: negative for - hot flashes, malaise/lethargy, palpitations, polydipsia/polyuria, skin changes, temperature intolerance or unexpected weight changes  Respiratory ROS: negative for - cough,  "hemoptysis, pleuritic pain, shortness of breath or wheezing  Cardiovascular ROS: no chest pain or dyspnea on exertion  Gastrointestinal ROS: no abdominal pain, change in bowel habits, or black or bloody stools  Genito-Urinary ROS: no dysuria, trouble voiding, or hematuria  Musculoskeletal ROS: negative for - joint pain, joint stiffness, joint swelling, muscle pain or muscular weakness  Neurological ROS: negative for - dizziness, gait disturbance, headaches, impaired coordination/balance, numbness/tingling, tremors or visual changes  Dermatological ROS: negative for - dry skin, lumps, pruritus or rash    Objective   Vitals:  /70 (BP Location: Left arm, Patient Position: Sitting)   Pulse 74   Temp 36.3 °C (97.3 °F)   Resp 13   Ht 1.651 m (5' 5\")   Wt 79.4 kg (175 lb)   SpO2 98%   BMI 29.12 kg/m²       Physical Examination: General appearance - alert, well appearing, and in no distress  Mental status - alert, oriented to person, place, and time  Eyes - pupils equal and reactive, extraocular eye movements intact  Ears - bilateral TM's and external ear canals normal  Mouth - mucous membranes moist, pharynx normal without lesions  Neck - supple, no significant adenopathy  Lymphatics - no palpable lymphadenopathy, no hepatosplenomegaly  Chest - clear to auscultation, no wheezes, rales or rhonchi, symmetric air entry  Heart - normal rate, regular rhythm, normal S1, S2, no murmurs, rubs, clicks or gallops  Abdomen - soft, nontender, nondistended, no masses or organomegaly  Neurological - alert, oriented, normal speech, no focal findings or movement disorder noted  Musculoskeletal - no joint tenderness, deformity or swelling  Extremities: peripheral pulses normal, no pedal edema, no clubbing or cyanosis.    Lab on 08/01/2023   Component Date Value Ref Range Status    Cholesterol 08/01/2023 191  0 - 199 mg/dL Final    .      AGE      DESIRABLE   BORDERLINE HIGH   HIGH     0-19 Y     0 - 169       170 - 199     >/= " 200    20-24 Y     0 - 189       190 - 224     >/= 225         >24 Y     0 - 199       200 - 239     >/= 240   **All ranges are based on fasting samples. Specific   therapeutic targets will vary based on patient-specific   cardiac risk.  .   Pediatric guidelines reference:Pediatrics 2011, 128(S5).   Adult guidelines reference: NCEP ATPIII Guidelines,     CARLOS A 2001, 258:2486-97  .   Venipuncture immediately after or during the    administration of Metamizole may lead to falsely   low results. Testing should be performed immediately   prior to Metamizole dosing.    HDL 08/01/2023 65.1  mg/dL Final    .      AGE      VERY LOW   LOW     NORMAL    HIGH       0-19 Y       < 35   < 40     40-45     ----    20-24 Y       ----   < 40       >45     ----      >24 Y       ----   < 40     40-60      >60  .    Cholesterol/HDL Ratio 08/01/2023 2.9   Final    REF VALUES  DESIRABLE  < 3.4  HIGH RISK  > 5.0    LDL 08/01/2023 112 (H)  0 - 99 mg/dL Final    .                           NEAR      BORD      AGE      DESIRABLE  OPTIMAL    HIGH     HIGH     VERY HIGH     0-19 Y     0 - 109     ---    110-129   >/= 130     ----    20-24 Y     0 - 119     ---    120-159   >/= 160     ----      >24 Y     0 -  99   100-129  130-159   160-189     >/=190  .    VLDL 08/01/2023 14  0 - 40 mg/dL Final    Triglycerides 08/01/2023 71  0 - 149 mg/dL Final    .      AGE      DESIRABLE   BORDERLINE HIGH   HIGH     VERY HIGH   0 D-90 D    19 - 174         ----         ----        ----  91 D- 9 Y     0 -  74        75 -  99     >/= 100      ----    10-19 Y     0 -  89        90 - 129     >/= 130      ----    20-24 Y     0 - 114       115 - 149     >/= 150      ----         >24 Y     0 - 149       150 - 199    200- 499    >/= 500  .   Venipuncture immediately after or during the    administration of Metamizole may lead to falsely   low results. Testing should be performed immediately   prior to Metamizole dosing.    Glucose 08/01/2023 85  74 - 99 mg/dL  Final    Sodium 08/01/2023 143  136 - 145 mmol/L Final    Potassium 08/01/2023 4.6  3.5 - 5.3 mmol/L Final    Chloride 08/01/2023 107  98 - 107 mmol/L Final    Bicarbonate 08/01/2023 29  21 - 32 mmol/L Final    Anion Gap 08/01/2023 12  10 - 20 mmol/L Final    Urea Nitrogen 08/01/2023 19  6 - 23 mg/dL Final    Creatinine 08/01/2023 0.63  0.50 - 1.05 mg/dL Final    GFR Female 08/01/2023 >90  >90 mL/min/1.73m2 Final     CALCULATIONS OF ESTIMATED GFR ARE PERFORMED   USING THE 2021 CKD-EPI STUDY REFIT EQUATION   WITHOUT THE RACE VARIABLE FOR THE IDMS-TRACEABLE   CREATININE METHODS.    https://jasn.asnjournals.org/content/early/2021/09/22/ASN.3694473175    Calcium 08/01/2023 9.4  8.6 - 10.3 mg/dL Final    Albumin 08/01/2023 4.0  3.4 - 5.0 g/dL Final    Alkaline Phosphatase 08/01/2023 64  33 - 136 U/L Final    Total Protein 08/01/2023 6.4  6.4 - 8.2 g/dL Final    AST 08/01/2023 17  9 - 39 U/L Final    Total Bilirubin 08/01/2023 0.7  0.0 - 1.2 mg/dL Final    ALT (SGPT) 08/01/2023 11  7 - 45 U/L Final     Patients treated with Sulfasalazine may generate    falsely decreased results for ALT.    WBC 08/01/2023 4.0 (L)  4.4 - 11.3 x10E9/L Final    RBC 08/01/2023 4.36  4.00 - 5.20 x10E12/L Final    Hemoglobin 08/01/2023 12.0  12.0 - 16.0 g/dL Final    Hematocrit 08/01/2023 38.6  36.0 - 46.0 % Final    MCV 08/01/2023 89  80 - 100 fL Final    MCHC 08/01/2023 31.1 (L)  32.0 - 36.0 g/dL Final    Platelets 08/01/2023 142 (L)  150 - 450 x10E9/L Final    RDW 08/01/2023 14.5  11.5 - 14.5 % Final    Neutrophils % 08/01/2023 37.2  40.0 - 80.0 % Final    Immature Granulocytes %, Automated 08/01/2023 0.2  0.0 - 0.9 % Final     Immature Granulocyte Count (IG) includes promyelocytes,    myelocytes and metamyelocytes but does not include bands.   Percent differential counts (%) should be interpreted in the   context of the absolute cell counts (cells/L).    Lymphocytes % 08/01/2023 41.8  13.0 - 44.0 % Final    Monocytes % 08/01/2023 10.9  2.0  - 10.0 % Final    Eosinophils % 08/01/2023 7.2  0.0 - 6.0 % Final    Basophils % 08/01/2023 2.7  0.0 - 2.0 % Final    Neutrophils Absolute 08/01/2023 1.49  1.20 - 7.70 x10E9/L Final    Lymphocytes Absolute 08/01/2023 1.68  1.20 - 4.80 x10E9/L Final    Monocytes Absolute 08/01/2023 0.44  0.10 - 1.00 x10E9/L Final    Eosinophils Absolute 08/01/2023 0.29  0.00 - 0.70 x10E9/L Final    Basophils Absolute 08/01/2023 0.11 (H)  0.00 - 0.10 x10E9/L Final       Assessment/Plan     Problem List Items Addressed This Visit       Anemia    Relevant Orders    CBC and Auto Differential    Osteoporosis    Relevant Medications    zoledronic acid (Reclast) 5 mg/100 mL piggyback    Peripheral neuropathy, idiopathic    Relevant Orders    Referral to Neurology    Routine general medical examination at a health care facility - Primary     Other Visit Diagnoses       Need for pneumococcal vaccine        Relevant Orders    Pneumococcal conjugate vaccine, 20-valent, adult (PREVNAR 20) (Completed)          Scribe Attestation  By signing my name below, I, Samantha Carvajal   attest that this documentation has been prepared under the direction and in the presence of Mat Tatum MD.

## 2023-08-28 ENCOUNTER — TELEPHONE (OUTPATIENT)
Dept: PRIMARY CARE | Facility: CLINIC | Age: 73
End: 2023-08-28

## 2023-08-28 DIAGNOSIS — U07.1 COVID: Primary | ICD-10-CM

## 2023-08-28 RX ORDER — NIRMATRELVIR AND RITONAVIR 300-100 MG
3 KIT ORAL 2 TIMES DAILY
Qty: 30 TABLET | Refills: 0 | Status: SHIPPED | OUTPATIENT
Start: 2023-08-28 | End: 2023-11-22 | Stop reason: WASHOUT

## 2023-08-28 NOTE — TELEPHONE ENCOUNTER
Patients  called olivia tested positive for covid yesterday and it started as a Runny nose, now she has a bad cough, sore throat, headache and body aches and was wondering if something could be sent to [facility].

## 2023-08-28 NOTE — TELEPHONE ENCOUNTER
Patients  called stating [facility] does not carry paxlovid or any covid medication. They are requesting the prescription to be sent to [facility]. He is also requesting cough syrup for her.   Please advise

## 2023-11-22 ENCOUNTER — OFFICE VISIT (OUTPATIENT)
Dept: NEUROLOGY | Facility: CLINIC | Age: 73
End: 2023-11-22
Payer: MEDICARE

## 2023-11-22 VITALS
SYSTOLIC BLOOD PRESSURE: 147 MMHG | DIASTOLIC BLOOD PRESSURE: 91 MMHG | BODY MASS INDEX: 28.92 KG/M2 | TEMPERATURE: 96.4 F | WEIGHT: 173.6 LBS | HEART RATE: 85 BPM | HEIGHT: 65 IN

## 2023-11-22 DIAGNOSIS — M54.16 LUMBAR RADICULOPATHY: ICD-10-CM

## 2023-11-22 DIAGNOSIS — G62.9 POLYNEUROPATHY: Primary | ICD-10-CM

## 2023-11-22 PROCEDURE — 1036F TOBACCO NON-USER: CPT | Performed by: PSYCHIATRY & NEUROLOGY

## 2023-11-22 PROCEDURE — 99204 OFFICE O/P NEW MOD 45 MIN: CPT | Performed by: PSYCHIATRY & NEUROLOGY

## 2023-11-22 PROCEDURE — 1125F AMNT PAIN NOTED PAIN PRSNT: CPT | Performed by: PSYCHIATRY & NEUROLOGY

## 2023-11-22 PROCEDURE — 1159F MED LIST DOCD IN RCRD: CPT | Performed by: PSYCHIATRY & NEUROLOGY

## 2023-11-22 PROCEDURE — 1160F RVW MEDS BY RX/DR IN RCRD: CPT | Performed by: PSYCHIATRY & NEUROLOGY

## 2023-11-22 RX ORDER — TERBINAFINE HYDROCHLORIDE 10 MG/G
CREAM TOPICAL
COMMUNITY
Start: 2023-10-19

## 2023-11-22 ASSESSMENT — LIFESTYLE VARIABLES
HOW OFTEN DO YOU HAVE SIX OR MORE DRINKS ON ONE OCCASION: NEVER
HOW OFTEN DO YOU HAVE A DRINK CONTAINING ALCOHOL: NEVER
SKIP TO QUESTIONS 9-10: 1
HOW MANY STANDARD DRINKS CONTAINING ALCOHOL DO YOU HAVE ON A TYPICAL DAY: PATIENT DOES NOT DRINK
AUDIT-C TOTAL SCORE: 0

## 2023-11-22 ASSESSMENT — PATIENT HEALTH QUESTIONNAIRE - PHQ9
1. LITTLE INTEREST OR PLEASURE IN DOING THINGS: NOT AT ALL
SUM OF ALL RESPONSES TO PHQ9 QUESTIONS 1 & 2: 0
2. FEELING DOWN, DEPRESSED OR HOPELESS: NOT AT ALL

## 2023-11-22 NOTE — PROGRESS NOTES
Consulting Physician:    Chief Complaint:  Neuropathy    History Of Present Illness  Rachel Mann is a 73 y.o. female presenting with neuropathy.    About 16 years ago, the patient had back surgery. She had a left foot drop as a complication from the surgery. Last year, she was moving furniture. Following this, she developed sciatica pain down her right leg.  She did do PT which did not help.  She describes a shooting pain from her low back down her right leg.  She also notes muscle spasms in her right leg.  She also notes weakness in her right leg.  She has numbness in her right leg (from the knee on downward).  She has a walker.  She does have urinary incontinence which started last November.  She denies any weakness/numbness in her arms.  She denies any mid back or neck pain  She denies any double vision, slurred speech, or facial droop.      Past Medical History  No past medical history on file.    Surgical History  Past Surgical History:   Procedure Laterality Date    OTHER SURGICAL HISTORY  05/18/2019    Hysterectomy    OTHER SURGICAL HISTORY  05/18/2019    Back surgery    OTHER SURGICAL HISTORY  05/18/2019    Tonsillectomy    OTHER SURGICAL HISTORY  05/18/2019    Gastric bypass surgery       Family History  No family history on file.     Social History   reports that she has never smoked. She has never used smokeless tobacco. She reports that she does not currently use alcohol. She reports that she does not currently use drugs.     Allergies  Patient has no known allergies.    Medications    Current Outpatient Medications:     b complex vitamins capsule, Vitamin B Complex CAPS  Refills: 0     Active, Disp: , Rfl:     calcium 500 mg calcium (1,250 mg) tablet, Calcium 500 MG TABS  Refills: 0     Active, Disp: , Rfl:     cholecalciferol (Vitamin D-3) 50 mcg (2,000 unit) capsule, Vitamin D3 50 MCG (2000 UT) Oral Capsule  Refills: 0     Active, Disp: , Rfl:     LamISIL AT 1 % cream, Apply to affected area two  "times a day., Disp: , Rfl:     magnesium 250 mg tablet, Take 1 tablet (250 mg) by mouth once daily., Disp: , Rfl:     vit C-Zn gluc-herbal no.325 (Elderberry Zinc Vit C) 90-15 mg lozenge, Take 1 daily, Disp: , Rfl:     zoledronic acid (Reclast) 5 mg/100 mL piggyback, INFUSE 5MG INTRAVENOUSLY OVER 15 MINUTES AS DIRECTED., Disp: 100 mL, Rfl: 0      Last Recorded Vitals   Blood pressure (!) 147/91, pulse 85, temperature 35.8 °C (96.4 °F), temperature source Temporal, height 1.651 m (5' 5\"), weight 78.7 kg (173 lb 9.6 oz).    Objective:  Gen: NAD  Neuro:  --HIF: A&O X 3, repetition and naming intact  --CN:  PERRLA, EOMI, VFF, no visible facial asymmetry, facial sensation intact, no tongue or palatal deviation, SCM intact  --Motor: Moves all 4 extremities equally except the following:     LLE: Hip Flexion 4, Knee Flexion 3, Knee Extension 3, DF 3, PF 4, Inversion 3, Eversion 3  --Sensory: Pin is reduced in the left foot to about the mid-foot; pin is nl on the right foot; vibration is absent in the toes  --Reflex: 1+in UE, absent LE, toes down; rangel'snegative  --Cerebellum: FTN and HTS intact  --Gait: Paretic Gait    Relevant Results  Lab Results   Component Value Date    WBC 4.0 (L) 08/01/2023    HGB 12.0 08/01/2023    HCT 38.6 08/01/2023    MCV 89 08/01/2023     (L) 08/01/2023       Lab Results   Component Value Date    GLUCOSE 85 08/01/2023    CALCIUM 9.4 08/01/2023     08/01/2023    K 4.6 08/01/2023    CO2 29 08/01/2023     08/01/2023    BUN 19 08/01/2023    CREATININE 0.63 08/01/2023       Lab Results   Component Value Date    HGBA1C 5.5 04/12/2023       Lab Results   Component Value Date    CHOL 191 08/01/2023    CHOL 211 (H) 07/28/2022    CHOL 178 06/21/2021     Lab Results   Component Value Date    HDL 65.1 08/01/2023    HDL 72.0 07/28/2022    HDL 69.0 06/21/2021     No results found for: \"LDLCALC\"  Lab Results   Component Value Date    TRIG 71 08/01/2023    TRIG 57 07/28/2022    TRIG 61 " "06/21/2021     No components found for: \"CHOLHDL\"    B12 2803  TSH 1.64  B6 76.1  Folic Acid > 22.3  SPEP with POOL: negative  Hemoglobin A1C: 5.5    EMG/NCV (I personally reviewed the images/tracings with the following interpretation)  :  Multilevel radiculopathy  Sensorimotor axonal polynueropathy     MRI L Spine:     FINDINGS:  There is a transitional lumbosacral vertebral body which the sake of  this dictation will be labeled L5.     The coronal  images demonstrate a dextrocurvature of the lumbar  spine.     There is 2 mm of retrolisthesis of L2 on L3.     There are degenerative signal changes noted along endplates within  lumbar region most pronounced at the L1/2, L2/3, and L3/4 levels.     There are scattered hemangiomas and/or focal fatty rests.     The visualized spinal cord demonstrates no signal abnormality within  it.        At the L5/S1 level,  there are mild degenerative facet changes  without significant spinal canal or neural foraminal narrowing.     At the L4/L5 level,  there is again evidence of asymmetric diminished  caliber of the left L4 lamina suggesting postoperative changes from a  previous left L4 hemilaminectomy. There is posterior osteophytic  spurring and posterior disc bulge slightly more pronounced to the  right of midline along with degenerative facet changes and  right-sided ligamentum flavum hypertrophy. There is asymmetric  encroachment upon the right lateral recess. There is mild-to-moderate  narrowing of the thecal sac within the spinal canal. There is  moderate right and mild-to-moderate left-sided neural foraminal  narrowing.     At the L3/L4 level,  there is again evidence of asymmetric diminished  caliber of the left L3 lamina suggesting postoperative changes from a  previous left L3 hemilaminectomy. There is posterior osteophytic  spurring and a posterior disc bulge along with degenerative facet  changes. There is mild overall encroachment upon the spinal canal.  There is " moderate encroachment upon the neural foramen bilaterally.     At the L2/L3 level,  there is 2 mm of retrolisthesis of L2 on L3,  posterior osteophytic spurring, posterior disc bulge, along with  degenerative facet changes and ligamentum flavum hypertrophy  contributing to mild-to-moderate narrowing of the thecal sac within  the spinal canal. There is mild encroachment upon the neural foramen  bilaterally.     At the L1/L2 level,  there is posterior osteophytic spurring and  posterior disc bulge along with degenerative facet changes  contributing to mild overall narrowing of the thecal sac within the  spinal canal. There is moderate bilateral neural foraminal narrowing.     At the T12/L1 level,  there is mild posterior osteophytic spurring  and posterior disc bulge along with degenerative facet changes  contribute to mild encroachment upon the spinal canal. There is no  significant neural foraminal narrowing.     There is atrophy/fatty infiltration of the posterior paraspinal  musculature within the lower lumbar/sacral region.     There are small subcentimeter suspected cystic foci within the right  kidney.     IMPRESSION:  There is a transitional lumbosacral vertebral body which the sake of  this dictation will be labeled L5.     The coronal  images demonstrate a dextrocurvature of the lumbar  spine.     There is 2 mm of retrolisthesis of L2 on L3.     There is multilevel spondylosis with varying degrees of spinal canal  and neural foraminal narrowing as described above.     There is atrophy/fatty infiltration of the posterior paraspinal  musculature within the lower lumbar/sacral region.     There are small subcentimeter suspected cystic foci within the right  kidney.       Assessment:  This is a 73 year old female presenting for evaluation of neuropathy.  About 16 years ago, she underwent back surgery.  It was complicated by left leg weakness.  Last year, she developed right sided sciatica which has  persisted over the last several months.  On exam, she has distal sensory loss b/l and left leg weakness.  MRI L Spine reviewed and notable for multilevel degenerative changes.  EMG/NCV notable for sensorimotor axonal polyneuropathy.  Neuropathy labs are unremarkable.     Lumbar Radiculopathy  - no improvement with PT  - recommend evaluation by pain mgmt    2.  Polyneuropathy  - idiopathic (neuropathy labs above)  - will continue to monitor for now        Nael Swann MD  [facility]  Department of Neurology      A copy of this note was sent to the referring provider.

## 2023-12-20 ENCOUNTER — OFFICE VISIT (OUTPATIENT)
Dept: PAIN MEDICINE | Facility: CLINIC | Age: 73
End: 2023-12-20
Payer: MEDICARE

## 2023-12-20 DIAGNOSIS — M54.16 LUMBAR RADICULOPATHY: Primary | ICD-10-CM

## 2023-12-20 PROCEDURE — 1036F TOBACCO NON-USER: CPT | Performed by: PAIN MEDICINE

## 2023-12-20 PROCEDURE — 1125F AMNT PAIN NOTED PAIN PRSNT: CPT | Performed by: PAIN MEDICINE

## 2023-12-20 PROCEDURE — 1159F MED LIST DOCD IN RCRD: CPT | Performed by: PAIN MEDICINE

## 2023-12-20 PROCEDURE — 1160F RVW MEDS BY RX/DR IN RCRD: CPT | Performed by: PAIN MEDICINE

## 2023-12-20 PROCEDURE — 99203 OFFICE O/P NEW LOW 30 MIN: CPT | Performed by: PAIN MEDICINE

## 2023-12-20 RX ORDER — DULOXETIN HYDROCHLORIDE 30 MG/1
30 CAPSULE, DELAYED RELEASE ORAL DAILY
Qty: 7 CAPSULE | Refills: 0 | Status: SHIPPED | OUTPATIENT
Start: 2023-12-20 | End: 2024-01-11 | Stop reason: DRUGHIGH

## 2023-12-20 RX ORDER — DULOXETIN HYDROCHLORIDE 60 MG/1
60 CAPSULE, DELAYED RELEASE ORAL DAILY
Qty: 30 CAPSULE | Refills: 11 | Status: SHIPPED | OUTPATIENT
Start: 2023-12-20 | End: 2024-03-16 | Stop reason: SINTOL

## 2023-12-20 ASSESSMENT — PAIN SCALES - GENERAL: PAINLEVEL_OUTOF10: 6

## 2023-12-20 ASSESSMENT — PAIN - FUNCTIONAL ASSESSMENT: PAIN_FUNCTIONAL_ASSESSMENT: 0-10

## 2023-12-20 NOTE — PROGRESS NOTES
Subjective   Patient ID: Rachel Mann is a 73 y.o. female with a past medical history of obesity status post bariatric surgery, lumbar postlaminectomy syndrome with resulting left-sided foot drop, idiopathic peripheral polyneuropathy who is seen as a new patient in clinic for evaluation of back and leg pain after being referred by Dr. Swann in neurology.      HPI:   She originally had left-sided radicular symptoms around 18 years ago for which she had undergone physical therapy chiropractic, epidural steroid injections and ultimately had surgery around 16 years ago.  Initially, she had excellent pain relief of her L5 radicular symptoms, but this pain recurred and worsened when she developed foot drop while under going physical therapy.  She has had a chronic left-sided foot drop ever since.  She wears an AFO.    She was recently found to have bilateral lower extremity peripheral neuropathy.    Back and leg pain:  Over the past 6 to 8 months she developed right-sided radicular symptoms that started in her back and radiate down to her lateral right ankle.  The pain is worse with standing and better with sitting.  The pain is worse with activity.  The pain is limiting her ability to accomplish her instrumental activities of daily living.  She describes the pain as burning, pressure, vice like and the pain prevents her from sleeping.  She is having no trouble falling asleep but is woken up several times at night due to the pain.    She has never taken gabapentin, Cymbalta, or amitriptyline.  She has no history of kidney stones or glaucoma.    Physical Therapy: She has done 4 weeks of aqua therapy in the past 6 months, but she had to stop physical therapy due to exacerbations of her pain and she was unable to complete physical therapy.  Other Conservative Measures she has tried: Chiropractic, TENS, Heating Pad, Ice, Massage/myofascial release, and Injections  Classes of medications tried in the past: Acetaminophen,  NSAIDs, and Topical agents    Review of Systems   13-point ROS done and negative except for HPI.     Current Outpatient Medications   Medication Instructions    b complex vitamins capsule Vitamin B Complex CAPS   Refills: 0       Active    calcium 500 mg calcium (1,250 mg) tablet Calcium 500 MG TABS   Refills: 0       Active    cholecalciferol (Vitamin D-3) 50 mcg (2,000 unit) capsule Vitamin D3 50 MCG (2000 UT) Oral Capsule   Refills: 0       Active    LamISIL AT 1 % cream Apply to affected area two times a day.    magnesium 250 mg tablet 1 tablet, oral, Daily    vit C-Zn gluc-herbal no.325 (Elderberry Zinc Vit C) 90-15 mg lozenge Take 1 daily    zoledronic acid (Reclast) 5 mg/100 mL piggyback INFUSE 5MG INTRAVENOUSLY OVER 15 MINUTES AS DIRECTED.       No past medical history on file.     Past Surgical History:   Procedure Laterality Date    OTHER SURGICAL HISTORY  05/18/2019    Hysterectomy    OTHER SURGICAL HISTORY  05/18/2019    Back surgery    OTHER SURGICAL HISTORY  05/18/2019    Tonsillectomy    OTHER SURGICAL HISTORY  05/18/2019    Gastric bypass surgery        No family history on file.     No Known Allergies     Objective     There were no vitals filed for this visit.     Physical Exam      General: NAD, well groomed, well nourished  Eyes: Non-icteric sclera, EOMI  Ears, Nose, Mouth, and Throat: External ears and nose appear to be without deformity or rash. No lesions or masses noted. Hearing is grossly intact.   Neck: Trachea midline  Respiratory: Nonlabored breathing   Cardiovascular: +2 peripheral edema   Skin: No rashes or open lesions/ulcers identified on skin.  Fibromyalgia Exam: Multiple tenderpoints to palpation including midclavicular line, deltoids, biceps, forearms, thighs, ankles, bilaterally.      Back:   Palpation: Tenderness to palpation over lumbar paraspinous muscles.     Straight leg raise: positive at 50 degrees on the right     Hip: Pain not reproduced with hip internal/external  rotation.  and Pain over right greater trochanter.    Neurologic:   Cranial nerves grossly intact.   Strength plantarflexion 5 out of 5 and symmetric bilaterally, dorsiflexion 5 out of 5 on the right, absent on the left.  Sensation: Normal to light touch throughout, diminished pinprick sensation over lower extremities; intact proximal to knees.  DTRs: Absent patellar  Saha: absent  Clonus: absent    Psychiatric: Alert, orientation to person, place, and time. Cooperative.    Imaging personally reviewed and independently interpreted: Lumbar MRI from 2023 shows disc bulges at multiple levels including L1/2 L2/3, L3/4, L4/5.  No significant Modic changes mild amount of fatty infiltration in the multifidus decreased volume of the psoas muscle, mild right L5 foraminal narrowing, mild to moderate L4 transforaminal narrowing on the left as well as at L3.  There is mild central canal stenosis at L2/3. No significant central canal stenosis.     Lumbar x-rays show grade 1 spondylolisthesis of L1 on L2 and L4 on L5     Assessment/Plan   73-year-old female with lumbar postlaminectomy syndrome, chronic left-sided foot drop, with acute on chronic right L5/S1 radiculopathy.  She also has a new diagnosis of fibromyalgia and a recent diagnosis of idiopathic bilateral lower extremity polyneuropathy based off of EMG and physical exam findings.    Her peripheral neuropathy and back pain are severely limiting her ability to engage in her hobbies and instrumental activities of daily living.  She is unable to participate in physical therapy due to exacerbations of her pain.      The patient was explained that the mainstay of managing fibromyalgia is managing existing depression or anxiety, maximizing sleep quality, and to participate in low-impact aerobic exercise for 1 hour day, 6 days a week. Examples of low impact aerobic exercise includes swimming, riding a stationary bicycle or exercising on an elliptical machine. Low-impact  aerobic exercise at that rate results in much better pain relief than any of the medications that could be prescribed for fibromyalgia and without side effects. The patient must be compliant, however, and must participate in such therapy for 8 weeks consecutively before noticing a remarkable response. It was explained to the patient the importance of maintaining motivation and setting realistic goals such as titrating up from a short duration such as 1 minute every day until meeting the 60 minutes a day, 6 days a week goal. The patient was receptive to the counseling and agrees to continue to exercise toward this goal.    Plan:  -Caudal epidural steroid injection with right-sided bias  -Start Cymbalta titration  -Discussed starting either amitriptyline versus gabapentin versus topiramate at next exam if she needs additional pain relief--she is having both trouble sleeping and desires weight loss  -Discussed spinal cord stimulation should she fail medication management for her peripheral neuropathy    The patient has failed treatment with : Physical therapy , three or more classes of medications., alternative therapies, have significant impairments of their instrumental activities of daily living (IADLs) due to the pain, they have failed surgery, and the procedure is medically indicated    We discussed  the risks, benefits and alternatives of the procedure including but not limited to: , Paralysis, Epidural hematoma, Post-dural puncture headache, Lack of efficacy , Transiently worsening pain , Bleeding, Infection , Nerve Damage, and The patient or her decision-maker expressed understanding and agreed to proceed. All questions were answered to the best of my ability.     Follow up: In 3 months    The patient was invited to contact us back anytime with any questions or concerns and follow-up with us in the office as needed.     Diagnoses and all orders for this visit:  Lumbar radiculopathy  -     Referral to Pain  Medicine      This note was generated with the aid of dictation software, there may be typos despite my attempts at proofreading.

## 2024-01-03 ENCOUNTER — HOSPITAL ENCOUNTER (OUTPATIENT)
Dept: OPERATING ROOM | Facility: CLINIC | Age: 74
Discharge: HOME | End: 2024-01-03
Payer: MEDICARE

## 2024-01-03 ENCOUNTER — ANCILLARY PROCEDURE (OUTPATIENT)
Dept: RADIOLOGY | Facility: CLINIC | Age: 74
End: 2024-01-03
Payer: MEDICARE

## 2024-01-03 VITALS
OXYGEN SATURATION: 97 % | SYSTOLIC BLOOD PRESSURE: 133 MMHG | HEART RATE: 71 BPM | DIASTOLIC BLOOD PRESSURE: 63 MMHG | HEIGHT: 64 IN | RESPIRATION RATE: 16 BRPM | TEMPERATURE: 97.2 F | BODY MASS INDEX: 29.62 KG/M2 | WEIGHT: 173.5 LBS

## 2024-01-03 DIAGNOSIS — M54.16 LUMBAR RADICULOPATHY: ICD-10-CM

## 2024-01-03 DIAGNOSIS — M54.16 RIGHT LUMBAR RADICULOPATHY: Primary | ICD-10-CM

## 2024-01-03 DIAGNOSIS — M54.16 RIGHT LUMBAR RADICULOPATHY: ICD-10-CM

## 2024-01-03 PROCEDURE — 2500000005 HC RX 250 GENERAL PHARMACY W/O HCPCS: Performed by: PAIN MEDICINE

## 2024-01-03 PROCEDURE — 96372 THER/PROPH/DIAG INJ SC/IM: CPT | Performed by: PAIN MEDICINE

## 2024-01-03 PROCEDURE — 2500000004 HC RX 250 GENERAL PHARMACY W/ HCPCS (ALT 636 FOR OP/ED): Performed by: PAIN MEDICINE

## 2024-01-03 PROCEDURE — 62323 NJX INTERLAMINAR LMBR/SAC: CPT | Performed by: PAIN MEDICINE

## 2024-01-03 PROCEDURE — 77003 FLUOROGUIDE FOR SPINE INJECT: CPT | Mod: RSC

## 2024-01-03 RX ORDER — SODIUM CHLORIDE, SODIUM LACTATE, POTASSIUM CHLORIDE, CALCIUM CHLORIDE 600; 310; 30; 20 MG/100ML; MG/100ML; MG/100ML; MG/100ML
100 INJECTION, SOLUTION INTRAVENOUS CONTINUOUS
Status: DISCONTINUED | OUTPATIENT
Start: 2024-01-03 | End: 2024-01-04 | Stop reason: HOSPADM

## 2024-01-03 RX ORDER — LIDOCAINE HYDROCHLORIDE 5 MG/ML
INJECTION, SOLUTION INFILTRATION; PERINEURAL AS NEEDED
Status: COMPLETED | OUTPATIENT
Start: 2024-01-03 | End: 2024-01-03

## 2024-01-03 RX ORDER — FENTANYL CITRATE 50 UG/ML
25 INJECTION, SOLUTION INTRAMUSCULAR; INTRAVENOUS ONCE
Status: DISCONTINUED | OUTPATIENT
Start: 2024-01-03 | End: 2024-01-04 | Stop reason: HOSPADM

## 2024-01-03 RX ORDER — DEXAMETHASONE SODIUM PHOSPHATE 100 MG/10ML
INJECTION INTRAMUSCULAR; INTRAVENOUS AS NEEDED
Status: COMPLETED | OUTPATIENT
Start: 2024-01-03 | End: 2024-01-03

## 2024-01-03 RX ORDER — MIDAZOLAM HYDROCHLORIDE 1 MG/ML
1 INJECTION, SOLUTION INTRAMUSCULAR; INTRAVENOUS AS NEEDED
Status: DISCONTINUED | OUTPATIENT
Start: 2024-01-03 | End: 2024-01-04 | Stop reason: HOSPADM

## 2024-01-03 RX ADMIN — LIDOCAINE HYDROCHLORIDE 10 ML: 5 INJECTION, SOLUTION INFILTRATION; PERINEURAL at 12:33

## 2024-01-03 RX ADMIN — DEXAMETHASONE SODIUM PHOSPHATE 10 MG: 10 INJECTION INTRAMUSCULAR; INTRAVENOUS at 12:34

## 2024-01-03 ASSESSMENT — COLUMBIA-SUICIDE SEVERITY RATING SCALE - C-SSRS
1. IN THE PAST MONTH, HAVE YOU WISHED YOU WERE DEAD OR WISHED YOU COULD GO TO SLEEP AND NOT WAKE UP?: NO
2. HAVE YOU ACTUALLY HAD ANY THOUGHTS OF KILLING YOURSELF?: NO
6. HAVE YOU EVER DONE ANYTHING, STARTED TO DO ANYTHING, OR PREPARED TO DO ANYTHING TO END YOUR LIFE?: NO

## 2024-01-03 ASSESSMENT — PAIN SCALES - GENERAL
PAINLEVEL_OUTOF10: 0 - NO PAIN
PAINLEVEL_OUTOF10: 4

## 2024-01-03 ASSESSMENT — PAIN - FUNCTIONAL ASSESSMENT
PAIN_FUNCTIONAL_ASSESSMENT: 0-10
PAIN_FUNCTIONAL_ASSESSMENT: 0-10

## 2024-01-03 NOTE — PROCEDURES
Rachel Mann is a 73 y.o.  female  with lumbar postlaminectomy syndrome here for caudal epidural steroid injection.    Procedure performed: Procedure performed: Caudal  epidural steroid injection under fluoroscopic guidance     Performed by: Dr. Spaulding  Assistant: John Troy MD     The patient was identified in the preoperative holding area and marked according to protocol.  Informed consent was obtained and he was brought to the operating room on a gurney. A timeout was performed and consent was verified.  ASA Standard monitors were applied.  Patient was positioned prone on the operating room table and x-ray was used to identify the target area.  Skin was prepped in the usual fashion with ChloraPrep and draped using sterile towels.  The skin was anesthetized with 0.5% lidocaine with bicarbonate using a 27-gauge hypodermic needle.  An 18-gauge angiocath was used to access the caudal space using biplanar fluoroscopy. After negative aspiration for heme and CSF, contrast was injected and showed epidural spread without intravascular or intrathecal injection.  Next, a 22 gauge 6 inch blunt coude needle was threaded through the anciocatheter into the caudal space using biplanar fluoroscopy. Contrast was again administered after negative aspiration and showed no intravascular or intrathecal spread. 4 mL of 0.5% lidocaine and 10 mg of dexamethasone were injected in the epidural space.  The  needle was removed. Hemostasis was ensured.  A bandage was applied.  The patient was brought to the recovery area in stable condition and there were no apparent complications.    All injected medications used were preservative-free and not .    Anesthesia: Local     John Troy MD

## 2024-01-03 NOTE — H&P
"HISTORY AND PHYSICAL    History Of Present Illness  Rachel Mann is a 73 y.o. female presenting with chronic pain.  Here for caudal epidural steroid injection.     she denies any recent antibiotic use or infections, she denies any blood thinner use , and she has no known medical allergies    Past Medical History  History reviewed. No pertinent past medical history.    Surgical History  Past Surgical History:   Procedure Laterality Date    OTHER SURGICAL HISTORY  05/18/2019    Hysterectomy    OTHER SURGICAL HISTORY  05/18/2019    Back surgery    OTHER SURGICAL HISTORY  05/18/2019    Tonsillectomy    OTHER SURGICAL HISTORY  05/18/2019    Gastric bypass surgery        Social History  She reports that she has never smoked. She has never used smokeless tobacco. She reports that she does not currently use alcohol. She reports that she does not currently use drugs.    Family History  No family history on file.     Allergies  Patient has no known allergies.    Review of Systems   12 point ROS done and negative except for the above.   Physical Exam     General: NAD, well groomed, well nourished  Eyes: Non-icteric sclera, EOMI  Ears, Nose, Mouth, and Throat: External ears and nose appear to be without deformity or rash. No lesions or masses noted. Hearing is grossly intact.   Neck: Trachea midline  Respiratory: Nonlabored breathing   Cardiovascular: No peripheral edema   Skin: No rashes or open lesions/ulcers identified on skin.    Last Recorded Vitals  Blood pressure 122/62, pulse 68, temperature 36.3 °C (97.3 °F), temperature source Temporal, resp. rate 16, height 1.626 m (5' 4\"), weight 78.7 kg (173 lb 8 oz), SpO2 97 %.    Relevant Results           Assessment/Plan       Risks, benefits, alternatives discussed. All questions answered to the best of my ability. Patient agrees to proceed.   -We will proceed with planned procedure        John Troy MD  Chronic Pain Fellow  [facility]    "

## 2024-01-04 ASSESSMENT — PAIN SCALES - GENERAL: PAINLEVEL_OUTOF10: 0 - NO PAIN

## 2024-01-11 ENCOUNTER — TELEMEDICINE (OUTPATIENT)
Dept: PAIN MEDICINE | Facility: CLINIC | Age: 74
End: 2024-01-11
Payer: MEDICARE

## 2024-01-11 DIAGNOSIS — M54.16 LUMBAR RADICULOPATHY: Primary | ICD-10-CM

## 2024-01-11 PROCEDURE — 99213 OFFICE O/P EST LOW 20 MIN: CPT | Performed by: PAIN MEDICINE

## 2024-01-11 RX ORDER — DULOXETIN HYDROCHLORIDE 30 MG/1
30 CAPSULE, DELAYED RELEASE ORAL DAILY
Qty: 30 CAPSULE | Refills: 11 | Status: SHIPPED | OUTPATIENT
Start: 2024-01-11 | End: 2024-03-15 | Stop reason: SINTOL

## 2024-01-11 NOTE — PROGRESS NOTES
Subjective   Patient ID: Rachel Mann is a 73 y.o. female who has virtual interview today post procedure    HPI  Ms. Mann is pleasant 73 y lady  diagnosed with post laminectomy syndrome. She had virtual interview today. She had caudal epidural injection a weak ago. She reported some relief still has deep pan on the rt hip area more than others. She has x ray hip was negative. Her MRI spine showed foraminal stenosis on multiple level worse on the L3/4 and L1/2. With other post surgery changes.   She had completed water therapy program with no much help.     Review of Systems   All other systems reviewed and are negative.         Current Outpatient Medications:     b complex vitamins capsule, Vitamin B Complex CAPS  Refills: 0     Active, Disp: , Rfl:     calcium 500 mg calcium (1,250 mg) tablet, Calcium 500 MG TABS  Refills: 0     Active, Disp: , Rfl:     cholecalciferol (Vitamin D-3) 50 mcg (2,000 unit) capsule, Vitamin D3 50 MCG (2000 UT) Oral Capsule  Refills: 0     Active, Disp: , Rfl:     DULoxetine (Cymbalta) 30 mg DR capsule, Take 1 capsule (30 mg) by mouth once daily for 7 days. Do not crush or chew., Disp: 7 capsule, Rfl: 0    DULoxetine (Cymbalta) 60 mg DR capsule, Take 1 capsule (60 mg) by mouth once daily. Do not crush or chew., Disp: 30 capsule, Rfl: 11    LamISIL AT 1 % cream, Apply to affected area two times a day., Disp: , Rfl:     magnesium 250 mg tablet, Take 1 tablet (250 mg) by mouth once daily., Disp: , Rfl:     vit C-Zn gluc-herbal no.325 (Elderberry Zinc Vit C) 90-15 mg lozenge, Take 1 daily, Disp: , Rfl:     zoledronic acid (Reclast) 5 mg/100 mL piggyback, INFUSE 5MG INTRAVENOUSLY OVER 15 MINUTES AS DIRECTED., Disp: 100 mL, Rfl: 0     No past medical history on file.     Past Surgical History:   Procedure Laterality Date    OTHER SURGICAL HISTORY  05/18/2019    Hysterectomy    OTHER SURGICAL HISTORY  05/18/2019    Back surgery    OTHER SURGICAL HISTORY  05/18/2019    Tonsillectomy    OTHER  SURGICAL HISTORY  05/18/2019    Gastric bypass surgery        No family history on file.     No Known Allergies     Objective     There were no vitals filed for this visit.     === 03/30/23 ===    MR FOOT    - Impression -  9 mm x 5 mm small lesion of the soft tissues of the right forefoot as  above. The imaging characteristics favor cystic lesion. No solid  component seen. The lesion can not be differentiated from the  cutaneous surface and could potentially be dermatologic in nature.    Other degenerative changes as above.     Physical Exam    Virtual interview  Assessment/Plan       Postlaminectomy syndrome        Plan    LTR Rt L3 and L4  Reduce duloxetine to 30 mg instead to 60 and check if the new symptoms improved

## 2024-01-29 ENCOUNTER — TELEMEDICINE (OUTPATIENT)
Dept: PRIMARY CARE | Facility: CLINIC | Age: 74
End: 2024-01-29
Payer: MEDICARE

## 2024-01-29 ENCOUNTER — PHARMACY VISIT (OUTPATIENT)
Dept: PHARMACY | Facility: CLINIC | Age: 74
End: 2024-01-29

## 2024-01-29 VITALS — HEIGHT: 64 IN | BODY MASS INDEX: 29.53 KG/M2 | WEIGHT: 173 LBS

## 2024-01-29 DIAGNOSIS — J06.9 UPPER RESPIRATORY TRACT INFECTION DUE TO COVID-19 VIRUS: Primary | ICD-10-CM

## 2024-01-29 DIAGNOSIS — U07.1 UPPER RESPIRATORY TRACT INFECTION DUE TO COVID-19 VIRUS: Primary | ICD-10-CM

## 2024-01-29 PROCEDURE — 99213 OFFICE O/P EST LOW 20 MIN: CPT | Performed by: FAMILY MEDICINE

## 2024-01-29 PROCEDURE — RXMED WILLOW AMBULATORY MEDICATION CHARGE

## 2024-01-29 RX ORDER — BROMPHENIRAMINE MALEATE, PSEUDOEPHEDRINE HYDROCHLORIDE, AND DEXTROMETHORPHAN HYDROBROMIDE 2; 30; 10 MG/5ML; MG/5ML; MG/5ML
10 SYRUP ORAL EVERY 6 HOURS PRN
Qty: 200 ML | Refills: 0 | Status: SHIPPED | OUTPATIENT
Start: 2024-01-29 | End: 2024-03-16 | Stop reason: ALTCHOICE

## 2024-01-29 RX ORDER — NIRMATRELVIR AND RITONAVIR 300-100 MG
3 KIT ORAL 2 TIMES DAILY
Qty: 30 TABLET | Refills: 0 | Status: SHIPPED | OUTPATIENT
Start: 2024-01-29 | End: 2024-02-03

## 2024-01-29 ASSESSMENT — ENCOUNTER SYMPTOMS
SORE THROAT: 1
DIARRHEA: 0
FREQUENCY: 0
WHEEZING: 0
HEADACHES: 1
CHILLS: 0
NUMBNESS: 0
VOMITING: 0
FEVER: 0
CONSTIPATION: 0
COUGH: 1
SINUS PAIN: 1
ABDOMINAL PAIN: 0
HEMATURIA: 0
TREMORS: 0
SHORTNESS OF BREATH: 0
PALPITATIONS: 0
RHINORRHEA: 0
DIZZINESS: 0
DYSURIA: 0

## 2024-01-29 ASSESSMENT — PATIENT HEALTH QUESTIONNAIRE - PHQ9
SUM OF ALL RESPONSES TO PHQ9 QUESTIONS 1 AND 2: 0
2. FEELING DOWN, DEPRESSED OR HOPELESS: NOT AT ALL
1. LITTLE INTEREST OR PLEASURE IN DOING THINGS: NOT AT ALL

## 2024-01-29 NOTE — PROGRESS NOTES
"Subjective   Patient ID: Rachel Mann is a 73 y.o. female who presents for URI (+ COVID ).    She had had a positive COVID test at home today.    URI   This is a new problem. Episode onset: 2 days ago. The problem has been unchanged. There has been no fever. Associated symptoms include congestion, coughing, ear pain, headaches, sinus pain, sneezing and a sore throat. Pertinent negatives include no abdominal pain, chest pain, diarrhea, dysuria, rhinorrhea, vomiting or wheezing. She has tried decongestant for the symptoms. The treatment provided mild relief.      Review of Systems   Constitutional:  Negative for chills and fever.   HENT:  Positive for congestion, ear pain, sinus pain, sneezing and sore throat. Negative for nosebleeds and rhinorrhea.    Respiratory:  Positive for cough. Negative for shortness of breath and wheezing.    Cardiovascular:  Negative for chest pain, palpitations and leg swelling.   Gastrointestinal:  Negative for abdominal pain, constipation, diarrhea and vomiting.   Genitourinary:  Negative for dysuria, frequency and hematuria.   Neurological:  Positive for headaches. Negative for dizziness, tremors and numbness.     Objective   Ht 1.626 m (5' 4\")   Wt 78.5 kg (173 lb)   BMI 29.70 kg/m²     Assessment/Plan   Problem List Items Addressed This Visit       Upper respiratory tract infection due to COVID-19 virus - Primary     We will start her on Paxlovid and Bromfed as needed.  Recommend liberal oral fluid intake.  Call if symptoms fail to improve as expected.    Follow-up if persistent or worsening symptoms otherwise when necessary.         Relevant Medications    nirmatrelvir-ritonavir (Paxlovid) 300 mg (150 mg x 2)-100 mg tablet therapy pack    brompheniramine-pseudoeph-DM 2-30-10 mg/5 mL syrup     Scribe Attestation  By signing my name below, IDavid Scribe   attest that this documentation has been prepared under the direction and in the presence of Mat Tatum MD.  "

## 2024-01-29 NOTE — ASSESSMENT & PLAN NOTE
We will start her on Paxlovid and Bromfed as needed.  Recommend liberal oral fluid intake.  Call if symptoms fail to improve as expected.    Follow-up if persistent or worsening symptoms otherwise when necessary.

## 2024-02-19 ENCOUNTER — HOSPITAL ENCOUNTER (OUTPATIENT)
Dept: RADIOLOGY | Facility: CLINIC | Age: 74
Discharge: HOME | End: 2024-02-19
Payer: MEDICARE

## 2024-02-19 ENCOUNTER — HOSPITAL ENCOUNTER (OUTPATIENT)
Dept: OPERATING ROOM | Facility: CLINIC | Age: 74
Discharge: HOME | End: 2024-02-19
Payer: MEDICARE

## 2024-02-19 VITALS
HEART RATE: 76 BPM | RESPIRATION RATE: 16 BRPM | TEMPERATURE: 98.4 F | SYSTOLIC BLOOD PRESSURE: 135 MMHG | OXYGEN SATURATION: 96 % | DIASTOLIC BLOOD PRESSURE: 77 MMHG | HEIGHT: 64 IN | BODY MASS INDEX: 29.73 KG/M2 | WEIGHT: 174.16 LBS

## 2024-02-19 DIAGNOSIS — M54.16 LUMBAR RADICULOPATHY: ICD-10-CM

## 2024-02-19 PROCEDURE — 2550000001 HC RX 255 CONTRASTS: Performed by: PAIN MEDICINE

## 2024-02-19 PROCEDURE — 2500000004 HC RX 250 GENERAL PHARMACY W/ HCPCS (ALT 636 FOR OP/ED): Performed by: PAIN MEDICINE

## 2024-02-19 PROCEDURE — 2500000005 HC RX 250 GENERAL PHARMACY W/O HCPCS: Performed by: PAIN MEDICINE

## 2024-02-19 PROCEDURE — 64484 NJX AA&/STRD TFRM EPI L/S EA: CPT | Performed by: PAIN MEDICINE

## 2024-02-19 PROCEDURE — 64483 NJX AA&/STRD TFRM EPI L/S 1: CPT | Performed by: PAIN MEDICINE

## 2024-02-19 RX ORDER — SODIUM BICARBONATE 42 MG/ML
INJECTION, SOLUTION INTRAVENOUS AS NEEDED
Status: COMPLETED | OUTPATIENT
Start: 2024-02-19 | End: 2024-02-19

## 2024-02-19 RX ORDER — DEXAMETHASONE SODIUM PHOSPHATE 100 MG/10ML
INJECTION INTRAMUSCULAR; INTRAVENOUS AS NEEDED
Status: COMPLETED | OUTPATIENT
Start: 2024-02-19 | End: 2024-02-19

## 2024-02-19 RX ORDER — LIDOCAINE HYDROCHLORIDE 5 MG/ML
INJECTION, SOLUTION INFILTRATION; PERINEURAL AS NEEDED
Status: COMPLETED | OUTPATIENT
Start: 2024-02-19 | End: 2024-02-19

## 2024-02-19 RX ADMIN — IOHEXOL 10 ML: 240 INJECTION, SOLUTION INTRATHECAL; INTRAVASCULAR; INTRAVENOUS; ORAL at 09:49

## 2024-02-19 RX ADMIN — LIDOCAINE HYDROCHLORIDE 10 ML: 5 INJECTION, SOLUTION INFILTRATION; PERINEURAL at 09:48

## 2024-02-19 RX ADMIN — DEXAMETHASONE SODIUM PHOSPHATE 10 MG: 10 INJECTION INTRAMUSCULAR; INTRAVENOUS at 09:47

## 2024-02-19 RX ADMIN — SODIUM BICARBONATE 1 MEQ: 42 INJECTION, SOLUTION INTRAVENOUS at 09:49

## 2024-02-19 ASSESSMENT — COLUMBIA-SUICIDE SEVERITY RATING SCALE - C-SSRS
6. HAVE YOU EVER DONE ANYTHING, STARTED TO DO ANYTHING, OR PREPARED TO DO ANYTHING TO END YOUR LIFE?: NO
1. IN THE PAST MONTH, HAVE YOU WISHED YOU WERE DEAD OR WISHED YOU COULD GO TO SLEEP AND NOT WAKE UP?: NO
2. HAVE YOU ACTUALLY HAD ANY THOUGHTS OF KILLING YOURSELF?: NO

## 2024-02-19 ASSESSMENT — PAIN - FUNCTIONAL ASSESSMENT
PAIN_FUNCTIONAL_ASSESSMENT: 0-10
PAIN_FUNCTIONAL_ASSESSMENT: 0-10

## 2024-02-19 ASSESSMENT — PAIN SCALES - GENERAL
PAINLEVEL_OUTOF10: 0 - NO PAIN
PAINLEVEL_OUTOF10: 5 - MODERATE PAIN

## 2024-02-19 ASSESSMENT — PAIN DESCRIPTION - DESCRIPTORS: DESCRIPTORS: ACHING;TENDER

## 2024-02-19 NOTE — H&P
HISTORY AND PHYSICAL    History Of Present Illness  Rachel Mann is a 73 y.o. female presenting with chronic pain.  Here for lumbar transforaminal right L3 and L4 steroid injection     she denies any recent antibiotic use or infections, she denies any blood thinner use , and she denies contrast or local anesthetic allergies     Past Medical History  Past Medical History:   Diagnosis Date    Arthritis 1982    Headache Young adult       Surgical History  Past Surgical History:   Procedure Laterality Date    BACK SURGERY  1/2006    HYSTERECTOMY  1995    OTHER SURGICAL HISTORY  05/18/2019    Hysterectomy    OTHER SURGICAL HISTORY  05/18/2019    Back surgery    OTHER SURGICAL HISTORY  05/18/2019    Tonsillectomy    OTHER SURGICAL HISTORY  05/18/2019    Gastric bypass surgery    WISDOM TOOTH EXTRACTION  1974        Social History  She reports that she has never smoked. She has never used smokeless tobacco. She reports that she does not drink alcohol and does not use drugs.    Family History  Family History   Problem Relation Name Age of Onset    Arthritis Mother Sylvia Erickson     Diabetes Mother Sylvia Erickson     Stroke Mother Sylvia Erickson     Alcohol abuse Father ERIK Erickson         Allergies  Patient has no known allergies.    Review of Systems   12 point ROS done and negative except for the above.   Physical Exam     General: NAD, well groomed, well nourished  Eyes: Non-icteric sclera, EOMI  Ears, Nose, Mouth, and Throat: External ears and nose appear to be without deformity or rash. No lesions or masses noted. Hearing is grossly intact.   Neck: Trachea midline  Respiratory: Nonlabored breathing   Cardiovascular: No peripheral edema   Skin: No rashes or open lesions/ulcers identified on skin.    Last Recorded Vitals  There were no vitals taken for this visit.    Relevant Results           Assessment/Plan       Risks, benefits, alternatives discussed. All questions answered to the best of my ability. Patient agrees to proceed.    -We will proceed with planned procedure        Barry Singh MD  Anesthesia, PGY2  [facility]

## 2024-02-19 NOTE — OP NOTE
* No procedures listed * Operative Note     Date: 2024  OR Location: [facility] [place] [facility] OR    Name: Rachel Mann, : 1950, Age: 73 y.o., MRN: [id number], Sex: female    Diagnosis  Postlaminectomy syndrome * No Diagnosis Codes entered *     Procedures  LTR L3 and L4 Rt side    Surgeons   Chelly    Resident/Fellow/Other Assistant:  Esmer    Procedure Summary  Anesthesia: local no sedation  ASA: ASA status not filed in the log.  Anesthesia Staff: Anesthesiologist: Xavi Spaulding MD  Anesthesia Resident: Maricruz Singh MD  Estimated Blood Loss: 0mL  Intra-op Medications: * Intraprocedure medication information is unavailable because the case start and end events have not been set *      Intraprocedure I/O Totals       None           Specimen: No specimens collected     Staff:   Circulator: Rizwana Montes RN  Scrub Person: Vale Liao RN; Bhumika Cai RN         Drains and/or Catheters: * None in log *    Tourniquet Times:         Implants:     Findings:     Indications: Rachel Mann is an 73 y.o. female who is having surgery for  postlaminectomy syndrome    The patient was seen in the preoperative area. The risks, benefits, complications, treatment options, non-operative alternatives, expected recovery and outcomes were discussed with the patient. The possibilities of reaction to medication, pulmonary aspiration, injury to surrounding structures, bleeding, recurrent infection, the need for additional procedures, failure to diagnose a condition, and creating a complication requiring transfusion or operation were discussed with the patient. The patient concurred with the proposed plan, giving informed consent.  The site of surgery was properly noted/marked if necessary per policy. The patient has been actively warmed in preoperative area. Preoperative antibiotics are not indicated. Venous thrombosis prophylaxis are not indicated.    Procedure Details:  Pt identified and consented. IV in rt hand.  Pt was taken to OR [facility] at [facility]. Transferred to OR table. Regular monitor applied. Rt side lumbosacral area exposed and scrubbed in regular fashion. By fluoro guidance L3 and L4 neuroforamen were identified after skin local, sprott needle inserted and directed to L3 and L4 foramen, needle position confirmed using contrast  2 ml of lidocaine half percent and 5 mg dexamethasone injected at every needle site. No complication   Complications:  None; patient tolerated the procedure well.    Disposition: PACU - hemodynamically stable.  Condition: stable         Additional Details:     Attending Attestation: I was present and scrubbed for the entire procedure.    MD Chelly

## 2024-02-19 NOTE — DISCHARGE INSTRUCTIONS
Post-injection instructions:    Your pain may not be gone immediately after the procedure--it usually takes the steroid 3-5 days to start working.   It may take several weeks for the medicine to reach its' full effect.   Pay attention to how much pain relief (what percentage compared to before the procedure) you get and for how long it lasts.   We will ask you for this at the follow up visit.     Activity: Avoid strenuous activity for 24 hours. After that return to your normal activity level.     Bandages: Remove tomorrow    Showering/Bathing: You may shower after bandage is removed     Follow up: With Dr. Spaulding over the phone in one week to discuss how you are doing  Call Summer to Schedule.   Summer's Phone:  [phone]    After hours, call the   ([phone]) and ask for the pain management answering service if you notice any of the below:     Call the doctor immediately: if you notice:    Excessive bleeding from procedure site (brisk bright red bleeding from the site or bleeding that soaks the bandages or does not stop)   Severe headache  Inability to walk, leg or arm weakness or numbness that is significantly worse after the procedure   Uncontrolled pain   Inability to control your bowels or bladder   Signs of infection: Fever above 101.5F, redness, swelling, pus or drainage from the site

## 2024-02-20 ASSESSMENT — PAIN SCALES - GENERAL: PAINLEVEL_OUTOF10: 0 - NO PAIN

## 2024-03-05 ENCOUNTER — TELEMEDICINE (OUTPATIENT)
Dept: PRIMARY CARE | Facility: CLINIC | Age: 74
End: 2024-03-05
Payer: MEDICARE

## 2024-03-05 VITALS — WEIGHT: 174 LBS | BODY MASS INDEX: 29.71 KG/M2 | HEIGHT: 64 IN

## 2024-03-05 DIAGNOSIS — R53.83 OTHER FATIGUE: ICD-10-CM

## 2024-03-05 DIAGNOSIS — E04.1 THYROID NODULE: ICD-10-CM

## 2024-03-05 DIAGNOSIS — M35.3 POLYMYALGIA (MULTI): ICD-10-CM

## 2024-03-05 DIAGNOSIS — R06.02 SHORTNESS OF BREATH: ICD-10-CM

## 2024-03-05 DIAGNOSIS — R91.8 MULTIPLE LUNG NODULES ON CT: Primary | ICD-10-CM

## 2024-03-05 DIAGNOSIS — M25.50 POLYARTHRALGIA: ICD-10-CM

## 2024-03-05 DIAGNOSIS — M25.50 POLYARTHRALGIA: Primary | ICD-10-CM

## 2024-03-05 PROCEDURE — 1036F TOBACCO NON-USER: CPT | Performed by: FAMILY MEDICINE

## 2024-03-05 PROCEDURE — 1125F AMNT PAIN NOTED PAIN PRSNT: CPT | Performed by: FAMILY MEDICINE

## 2024-03-05 PROCEDURE — 99213 OFFICE O/P EST LOW 20 MIN: CPT | Performed by: FAMILY MEDICINE

## 2024-03-05 PROCEDURE — 1159F MED LIST DOCD IN RCRD: CPT | Performed by: FAMILY MEDICINE

## 2024-03-05 PROCEDURE — 1123F ACP DISCUSS/DSCN MKR DOCD: CPT | Performed by: FAMILY MEDICINE

## 2024-03-05 ASSESSMENT — ENCOUNTER SYMPTOMS
SHORTNESS OF BREATH: 1
LEG PAIN: 1
FEVER: 0
SPUTUM PRODUCTION: 0
WHEEZING: 0
HEMOPTYSIS: 0

## 2024-03-05 NOTE — TELEPHONE ENCOUNTER
Rachel would like to speak to dr. Mayes nurse asa about a condition she's going through right now she's been in extreme pain for 11 days and she can't take anymore. She would like a virtual with dr. Tatum today or tomorrow and to talk to his nurse with some questions she has. Please advise

## 2024-03-05 NOTE — TELEPHONE ENCOUNTER
Spoke with patient,     Patient states she went to pain management Dr. Spaulding 2 weeks ago to get steroid injection for hip pain. 4 days after patient received injection, she is having a lot of unexplained pain all over body, 8/10 on pain scale. Patient was put on Cymbalta 60mg, then pain management decreased to 30mg.

## 2024-03-05 NOTE — PROGRESS NOTES
"Subjective   Patient ID: Rachel Mann is a 73 y.o. female who presents for Joint Pain.    Patient complains of arthralgias and myalgias, which have/has been present for a few weeks. Pain is located throughout her entire body and has been gradually worsening. She had 2 injections for pain in her lower back. She is unsure if her pain is from a reaction to the injections. She also notes that she had been started on Cymbalta 60 mg for fibromyalgia which has since been lowered due to the medication giving her tremors. After the 2nd injection 2 weeks ago she started having extreme pain. She can not take a deep breath due to the pain. She is having a hard time lifting legs and moving. She isn't sleeping due to the pain, she is at a stand still with what she should do, she can not continue this way.      Shortness of Breath  This is a new problem. The current episode started in the past 7 days. The problem has been gradually worsening. Associated symptoms include leg pain. Pertinent negatives include no abdominal pain, chest pain, ear pain, fever, headaches, hemoptysis, leg swelling, rhinorrhea, sore throat, sputum production, vomiting or wheezing. Nothing aggravates the symptoms. She has tried nothing for the symptoms.      Review of Systems   Constitutional:  Positive for fatigue. Negative for chills and fever.   HENT:  Negative for congestion, ear pain, nosebleeds, rhinorrhea and sore throat.    Respiratory:  Positive for shortness of breath. Negative for cough, hemoptysis, sputum production and wheezing.    Cardiovascular:  Negative for chest pain, palpitations and leg swelling.   Gastrointestinal:  Negative for abdominal pain, constipation, diarrhea and vomiting.   Genitourinary:  Negative for dysuria, frequency and hematuria.   Musculoskeletal:  Positive for arthralgias and back pain.   Neurological:  Negative for dizziness, tremors, numbness and headaches.       Objective   Ht 1.626 m (5' 4\")   Wt 78.9 kg (174 lb)  "  BMI 29.87 kg/m²     Assessment/Plan   Problem List Items Addressed This Visit       Polymyalgia (CMS/HCC)     We will order labs for further evaluation.         Relevant Orders    CK    Polyarthralgia - Primary     We will order labs for further evaluation.         Relevant Orders    JOSE EDUARDO with Reflex to [facility]    CBC and Auto Differential    Citrulline Antibody, IgG    Comprehensive Metabolic Panel    Sedimentation Rate (Completed)    Rheumatoid Factor    C-Reactive Protein    Serum Protein Electrophoresis + Immunofixation    Shortness of breath     We will order labs for further evaluation.         Relevant Orders    B-Type Natriuretic Peptide    D-Dimer, VTE Exclusion (Completed)    Serum Protein Electrophoresis + Immunofixation     Other Visit Diagnoses       Other fatigue        Relevant Orders    Tsh With Reflex To Free T4 If Abnormal          Related a lot of the symptoms through the Cymbalta however more however when the Cymbalta to take 30 mg every other day and then discontinue.  She was also advised to take Tylenol for the aches and pains.  Meanwhile lab ordered as above.    Scribe Attestation  By signing my name below, IDavid Scribe   attest that this documentation has been prepared under the direction and in the presence of Mat Tatum MD.

## 2024-03-06 ENCOUNTER — LAB (OUTPATIENT)
Dept: LAB | Facility: LAB | Age: 74
End: 2024-03-06
Payer: MEDICARE

## 2024-03-06 DIAGNOSIS — R06.02 SHORTNESS OF BREATH: ICD-10-CM

## 2024-03-06 DIAGNOSIS — M25.50 POLYARTHRALGIA: ICD-10-CM

## 2024-03-06 DIAGNOSIS — D64.9 ANEMIA, UNSPECIFIED TYPE: ICD-10-CM

## 2024-03-06 DIAGNOSIS — R53.83 OTHER FATIGUE: ICD-10-CM

## 2024-03-06 DIAGNOSIS — M35.3 POLYMYALGIA (MULTI): ICD-10-CM

## 2024-03-06 LAB
ALBUMIN SERPL BCP-MCNC: 4 G/DL (ref 3.4–5)
ALP SERPL-CCNC: 73 U/L (ref 33–136)
ALT SERPL W P-5'-P-CCNC: 14 U/L (ref 7–45)
ANION GAP SERPL CALC-SCNC: 10 MMOL/L (ref 10–20)
AST SERPL W P-5'-P-CCNC: 20 U/L (ref 9–39)
BASOPHILS # BLD AUTO: 0.1 X10*3/UL (ref 0–0.1)
BASOPHILS NFR BLD AUTO: 1.3 %
BILIRUB SERPL-MCNC: 0.6 MG/DL (ref 0–1.2)
BNP SERPL-MCNC: 15 PG/ML (ref 0–99)
BUN SERPL-MCNC: 22 MG/DL (ref 6–23)
CALCIUM SERPL-MCNC: 9.4 MG/DL (ref 8.6–10.3)
CCP IGG SERPL-ACNC: <1 U/ML
CHLORIDE SERPL-SCNC: 104 MMOL/L (ref 98–107)
CK SERPL-CCNC: 59 U/L (ref 0–215)
CO2 SERPL-SCNC: 29 MMOL/L (ref 21–32)
CREAT SERPL-MCNC: 0.62 MG/DL (ref 0.5–1.05)
CRP SERPL-MCNC: 1.44 MG/DL
D DIMER PPP FEU-MCNC: 1719 NG/ML FEU
EGFRCR SERPLBLD CKD-EPI 2021: >90 ML/MIN/1.73M*2
EOSINOPHIL # BLD AUTO: 0.39 X10*3/UL (ref 0–0.4)
EOSINOPHIL NFR BLD AUTO: 5.1 %
ERYTHROCYTE [DISTWIDTH] IN BLOOD BY AUTOMATED COUNT: 15.6 % (ref 11.5–14.5)
ERYTHROCYTE [SEDIMENTATION RATE] IN BLOOD BY WESTERGREN METHOD: 35 MM/H (ref 0–30)
GLUCOSE SERPL-MCNC: 72 MG/DL (ref 74–99)
HCT VFR BLD AUTO: 40.8 % (ref 36–46)
HGB BLD-MCNC: 12.2 G/DL (ref 12–16)
IMM GRANULOCYTES # BLD AUTO: 0.01 X10*3/UL (ref 0–0.5)
IMM GRANULOCYTES NFR BLD AUTO: 0.1 % (ref 0–0.9)
LYMPHOCYTES # BLD AUTO: 1.73 X10*3/UL (ref 0.8–3)
LYMPHOCYTES NFR BLD AUTO: 22.7 %
MCH RBC QN AUTO: 27.2 PG (ref 26–34)
MCHC RBC AUTO-ENTMCNC: 29.9 G/DL (ref 32–36)
MCV RBC AUTO: 91 FL (ref 80–100)
MONOCYTES # BLD AUTO: 0.57 X10*3/UL (ref 0.05–0.8)
MONOCYTES NFR BLD AUTO: 7.5 %
NEUTROPHILS # BLD AUTO: 4.81 X10*3/UL (ref 1.6–5.5)
NEUTROPHILS NFR BLD AUTO: 63.3 %
NRBC BLD-RTO: 0 /100 WBCS (ref 0–0)
PLATELET # BLD AUTO: 288 X10*3/UL (ref 150–450)
POTASSIUM SERPL-SCNC: 4.3 MMOL/L (ref 3.5–5.3)
PROT SERPL-MCNC: 6.9 G/DL (ref 6.4–8.2)
PROT SERPL-MCNC: 7.1 G/DL (ref 6.4–8.2)
RBC # BLD AUTO: 4.49 X10*6/UL (ref 4–5.2)
RHEUMATOID FACT SER NEPH-ACNC: <10 IU/ML (ref 0–15)
SODIUM SERPL-SCNC: 139 MMOL/L (ref 136–145)
TSH SERPL-ACNC: 1.57 MIU/L (ref 0.44–3.98)
WBC # BLD AUTO: 7.6 X10*3/UL (ref 4.4–11.3)

## 2024-03-06 PROCEDURE — 36415 COLL VENOUS BLD VENIPUNCTURE: CPT

## 2024-03-06 PROCEDURE — 85379 FIBRIN DEGRADATION QUANT: CPT

## 2024-03-06 PROCEDURE — 86235 NUCLEAR ANTIGEN ANTIBODY: CPT

## 2024-03-06 PROCEDURE — 82550 ASSAY OF CK (CPK): CPT

## 2024-03-06 PROCEDURE — 86334 IMMUNOFIX E-PHORESIS SERUM: CPT

## 2024-03-06 PROCEDURE — 86038 ANTINUCLEAR ANTIBODIES: CPT

## 2024-03-06 PROCEDURE — 86320 SERUM IMMUNOELECTROPHORESIS: CPT | Performed by: FAMILY MEDICINE

## 2024-03-06 PROCEDURE — 85025 COMPLETE CBC W/AUTO DIFF WBC: CPT

## 2024-03-06 PROCEDURE — 86225 DNA ANTIBODY NATIVE: CPT

## 2024-03-06 PROCEDURE — 84155 ASSAY OF PROTEIN SERUM: CPT

## 2024-03-06 PROCEDURE — 84165 PROTEIN E-PHORESIS SERUM: CPT | Performed by: FAMILY MEDICINE

## 2024-03-06 PROCEDURE — 80053 COMPREHEN METABOLIC PANEL: CPT

## 2024-03-06 PROCEDURE — 86200 CCP ANTIBODY: CPT

## 2024-03-06 PROCEDURE — 86431 RHEUMATOID FACTOR QUANT: CPT

## 2024-03-06 PROCEDURE — 86140 C-REACTIVE PROTEIN: CPT

## 2024-03-06 PROCEDURE — 84443 ASSAY THYROID STIM HORMONE: CPT

## 2024-03-06 PROCEDURE — 84165 PROTEIN E-PHORESIS SERUM: CPT

## 2024-03-06 PROCEDURE — 85652 RBC SED RATE AUTOMATED: CPT

## 2024-03-06 PROCEDURE — 83880 ASSAY OF NATRIURETIC PEPTIDE: CPT

## 2024-03-06 ASSESSMENT — ENCOUNTER SYMPTOMS
ARTHRALGIAS: 1
RHINORRHEA: 0
SORE THROAT: 0
HEMATURIA: 0
DIARRHEA: 0
VOMITING: 0
CONSTIPATION: 0
PALPITATIONS: 0
TREMORS: 0
NUMBNESS: 0
FATIGUE: 1
DIZZINESS: 0
ABDOMINAL PAIN: 0
FREQUENCY: 0
BACK PAIN: 1
DYSURIA: 0
HEADACHES: 0
COUGH: 0
CHILLS: 0

## 2024-03-07 ENCOUNTER — HOSPITAL ENCOUNTER (OUTPATIENT)
Dept: RADIOLOGY | Facility: HOSPITAL | Age: 74
Discharge: HOME | End: 2024-03-07
Payer: MEDICARE

## 2024-03-07 DIAGNOSIS — M79.605 BILATERAL LEG PAIN: ICD-10-CM

## 2024-03-07 DIAGNOSIS — R79.89 ELEVATED D-DIMER: ICD-10-CM

## 2024-03-07 DIAGNOSIS — R06.02 SHORTNESS OF BREATH: ICD-10-CM

## 2024-03-07 DIAGNOSIS — M79.604 BILATERAL LEG PAIN: ICD-10-CM

## 2024-03-07 LAB
ANA PATTERN: ABNORMAL
ANA SER QL HEP2 SUBST: POSITIVE
ANA TITR SER IF: ABNORMAL {TITER}
CENTROMERE B AB SER-ACNC: <0.2 AI
CHROMATIN AB SERPL-ACNC: <0.2 AI
DSDNA AB SER-ACNC: 2 IU/ML
ENA JO1 AB SER QL IA: <0.2 AI
ENA RNP AB SER IA-ACNC: 0.4 AI
ENA SCL70 AB SER QL IA: <0.2 AI
ENA SM AB SER IA-ACNC: <0.2 AI
ENA SM+RNP AB SER QL IA: <0.2 AI
ENA SS-A AB SER IA-ACNC: <0.2 AI
ENA SS-B AB SER IA-ACNC: <0.2 AI
RIBOSOMAL P AB SER-ACNC: <0.2 AI

## 2024-03-07 PROCEDURE — 2550000001 HC RX 255 CONTRASTS: Performed by: FAMILY MEDICINE

## 2024-03-07 PROCEDURE — 71275 CT ANGIOGRAPHY CHEST: CPT

## 2024-03-07 PROCEDURE — 71275 CT ANGIOGRAPHY CHEST: CPT | Performed by: RADIOLOGY

## 2024-03-07 PROCEDURE — 93970 EXTREMITY STUDY: CPT

## 2024-03-07 PROCEDURE — 93971 EXTREMITY STUDY: CPT | Performed by: RADIOLOGY

## 2024-03-07 RX ADMIN — IOHEXOL 75 ML: 350 INJECTION, SOLUTION INTRAVENOUS at 16:00

## 2024-03-08 ENCOUNTER — PHARMACY VISIT (OUTPATIENT)
Dept: PHARMACY | Facility: CLINIC | Age: 74
End: 2024-03-08
Payer: MEDICARE

## 2024-03-08 ENCOUNTER — HOSPITAL ENCOUNTER (OUTPATIENT)
Dept: RADIOLOGY | Facility: CLINIC | Age: 74
Discharge: HOME | End: 2024-03-08
Payer: MEDICARE

## 2024-03-08 DIAGNOSIS — E04.1 THYROID NODULE: ICD-10-CM

## 2024-03-08 PROCEDURE — 76536 US EXAM OF HEAD AND NECK: CPT | Performed by: RADIOLOGY

## 2024-03-08 PROCEDURE — 76536 US EXAM OF HEAD AND NECK: CPT

## 2024-03-08 PROCEDURE — RXMED WILLOW AMBULATORY MEDICATION CHARGE

## 2024-03-08 RX ORDER — CELECOXIB 200 MG/1
200 CAPSULE ORAL DAILY
Qty: 30 CAPSULE | Refills: 0 | Status: SHIPPED | OUTPATIENT
Start: 2024-03-08 | End: 2024-03-15 | Stop reason: SDUPTHER

## 2024-03-08 NOTE — TELEPHONE ENCOUNTER
Per Dr. Tatum refer patient to Rheumatology Dr. Young or Tammy, whoever can see her first and pend Celebrex 200mg to be taken daily #30 with no refill (no anti-inflammatory while taking)    Rx pended and order placed, patient is aware of all information and wanted DDM taken out of her chart

## 2024-03-08 NOTE — TELEPHONE ENCOUNTER
----- Message from Mat Tatum MD sent at 3/8/2024  6:43 AM EST -----  Please call Rachel Mann that her CT scan is negative for blood clot.  There are incidental finding of multiple lung nodules for which a repeat CT scan is recommended in 3 to 6 months, also right thyroid nodule.  Schedule ultrasound of the thyroid for right thyroid nodule now.  Thyroid ultrasound needs to be done for now.  Also schedule CT of the chest without contrast for multiple lung nodules in 4 months.

## 2024-03-08 NOTE — TELEPHONE ENCOUNTER
Schedule patient for the US and CT, patient mentioned that she is on day 15 of extreme pain. The pain is throughout her whole body. She is wanting to know if there is anything Dr. Tatum can do to help.     Please advise

## 2024-03-12 ENCOUNTER — APPOINTMENT (OUTPATIENT)
Dept: PAIN MEDICINE | Facility: CLINIC | Age: 74
End: 2024-03-12
Payer: MEDICARE

## 2024-03-12 LAB
ALBUMIN: 3.9 G/DL (ref 3.4–5)
ALPHA 1 GLOBULIN: 0.3 G/DL (ref 0.2–0.6)
ALPHA 2 GLOBULIN: 0.8 G/DL (ref 0.4–1.1)
BETA GLOBULIN: 0.9 G/DL (ref 0.5–1.2)
GAMMA GLOBULIN: 1.1 G/DL (ref 0.5–1.4)
IMMUNOFIXATION COMMENT: NORMAL
PATH REVIEW - SERUM IMMUNOFIXATION: NORMAL
PATH REVIEW-SERUM PROTEIN ELECTROPHORESIS: NORMAL
PROTEIN ELECTROPHORESIS COMMENT: NORMAL

## 2024-03-14 ENCOUNTER — APPOINTMENT (OUTPATIENT)
Dept: RHEUMATOLOGY | Facility: CLINIC | Age: 74
End: 2024-03-14
Payer: MEDICARE

## 2024-03-15 ENCOUNTER — OFFICE VISIT (OUTPATIENT)
Dept: PRIMARY CARE | Facility: CLINIC | Age: 74
End: 2024-03-15
Payer: MEDICARE

## 2024-03-15 VITALS
HEART RATE: 86 BPM | WEIGHT: 173.6 LBS | TEMPERATURE: 97.6 F | RESPIRATION RATE: 16 BRPM | BODY MASS INDEX: 29.64 KG/M2 | OXYGEN SATURATION: 95 % | SYSTOLIC BLOOD PRESSURE: 120 MMHG | DIASTOLIC BLOOD PRESSURE: 60 MMHG | HEIGHT: 64 IN

## 2024-03-15 DIAGNOSIS — K21.9 GASTROESOPHAGEAL REFLUX DISEASE WITHOUT ESOPHAGITIS: ICD-10-CM

## 2024-03-15 DIAGNOSIS — M35.3 POLYMYALGIA (MULTI): ICD-10-CM

## 2024-03-15 DIAGNOSIS — M25.50 POLYARTHRALGIA: ICD-10-CM

## 2024-03-15 PROCEDURE — RXMED WILLOW AMBULATORY MEDICATION CHARGE

## 2024-03-15 PROCEDURE — 99213 OFFICE O/P EST LOW 20 MIN: CPT | Performed by: FAMILY MEDICINE

## 2024-03-15 PROCEDURE — 1159F MED LIST DOCD IN RCRD: CPT | Performed by: FAMILY MEDICINE

## 2024-03-15 PROCEDURE — 1123F ACP DISCUSS/DSCN MKR DOCD: CPT | Performed by: FAMILY MEDICINE

## 2024-03-15 PROCEDURE — 1160F RVW MEDS BY RX/DR IN RCRD: CPT | Performed by: FAMILY MEDICINE

## 2024-03-15 PROCEDURE — 1036F TOBACCO NON-USER: CPT | Performed by: FAMILY MEDICINE

## 2024-03-15 RX ORDER — CELECOXIB 200 MG/1
200 CAPSULE ORAL DAILY
Qty: 30 CAPSULE | Refills: 0 | Status: SHIPPED | OUTPATIENT
Start: 2024-03-15

## 2024-03-15 RX ORDER — FAMOTIDINE 20 MG/1
20 TABLET, FILM COATED ORAL DAILY
Qty: 30 TABLET | Refills: 1 | Status: SHIPPED | OUTPATIENT
Start: 2024-03-15 | End: 2024-05-11 | Stop reason: SDUPTHER

## 2024-03-15 ASSESSMENT — ENCOUNTER SYMPTOMS
MYALGIAS: 1
ARTHRALGIAS: 1

## 2024-03-15 NOTE — PROGRESS NOTES
Subjective   Patient ID: Rachel Mann is a 73 y.o. female who presents for Follow-up (Polyarthralgia).    Patient complains of arthralgia and myalgia throughout her entire body that has been present for 3-4 weeks that have been gradually worsening. She was given 2 injections for pain in her lower back and does not know if the pain is a side effect of the injections. Patient has stopped taking the Cymbalta but is still taking the Celebrex. Patient is unable to sleep or take a deep breath due to the pain. She has a hard time lifting her legs or moving. Patient states that she has gotten to a point where she has become off balance. Has been experiencing stiffness upon awakening and is only leaving the house when necessary.  Symptoms started after she had a cervical epidural injection by the pain specialist.  She was being managed for her back pain but she did not have generalized joint pain or muscle pain at that time.  Her symptoms have been worsening.  She complains of pain in multiple joints and stiffness of the small joints of the hands and knees especially early in the morning and progressively gets worse through the course of the day.  She also feels very tired because of the pain.  She had a virtual visit with us 2 weeks ago and various lab studies were ordered including D-dimer which was positive and subsequently had a negative CT angiogram.  She also had blood for possible collagen diseases and rheumatoid arthritis which were all negative apart from borderline JOSE EDUARDO titer with negative reflex antibody testing.      Review of Systems   Constitutional:  Negative for chills and fever.   HENT:  Negative for congestion, ear pain, nosebleeds, rhinorrhea and sore throat.    Respiratory:  Negative for cough, shortness of breath and wheezing.    Cardiovascular:  Negative for chest pain, palpitations and leg swelling.   Gastrointestinal:  Negative for abdominal pain, constipation, diarrhea and vomiting.   Genitourinary:   "Negative for dysuria, frequency and hematuria.   Musculoskeletal:  Positive for arthralgias, gait problem and myalgias.   Neurological:  Negative for dizziness, tremors, numbness and headaches.     Objective   /60 (BP Location: Left arm, Patient Position: Sitting)   Pulse 86   Temp 36.4 °C (97.6 °F)   Resp 16   Ht 1.626 m (5' 4\")   Wt 78.7 kg (173 lb 9.6 oz)   SpO2 95%   BMI 29.80 kg/m²     Physical Exam  Constitutional:       General: She is not in acute distress.     Appearance: Normal appearance.   HENT:      Head: Normocephalic and atraumatic.      Mouth/Throat:      Mouth: Mucous membranes are moist.      Pharynx: Oropharynx is clear. No oropharyngeal exudate or posterior oropharyngeal erythema.   Eyes:      General: No scleral icterus.     Extraocular Movements: Extraocular movements intact.      Pupils: Pupils are equal, round, and reactive to light.   Cardiovascular:      Rate and Rhythm: Normal rate and regular rhythm.      Pulses: Normal pulses.      Heart sounds: No murmur heard.     No friction rub. No gallop.   Pulmonary:      Effort: Pulmonary effort is normal.      Breath sounds: No wheezing, rhonchi or rales.   Skin:     General: Skin is warm.      Coloration: Skin is not jaundiced or pale.      Findings: No erythema or rash.   Neurological:      General: No focal deficit present.      Mental Status: She is alert and oriented to person, place, and time.      Cranial Nerves: No cranial nerve deficit.      Sensory: No sensory deficit.      Coordination: Coordination normal.      Gait: Gait normal.       Lab on 03/06/2024   Component Date Value Ref Range Status    WBC 03/06/2024 7.6  4.4 - 11.3 x10*3/uL Final    nRBC 03/06/2024 0.0  0.0 - 0.0 /100 WBCs Final    RBC 03/06/2024 4.49  4.00 - 5.20 x10*6/uL Final    Hemoglobin 03/06/2024 12.2  12.0 - 16.0 g/dL Final    Hematocrit 03/06/2024 40.8  36.0 - 46.0 % Final    MCV 03/06/2024 91  80 - 100 fL Final    MCH 03/06/2024 27.2  26.0 - 34.0 pg " Final    MCHC 03/06/2024 29.9 (L)  32.0 - 36.0 g/dL Final    RDW 03/06/2024 15.6 (H)  11.5 - 14.5 % Final    Platelets 03/06/2024 288  150 - 450 x10*3/uL Final    Neutrophils % 03/06/2024 63.3  40.0 - 80.0 % Final    Immature Granulocytes %, Automated 03/06/2024 0.1  0.0 - 0.9 % Final    Immature Granulocyte Count (IG) includes promyelocytes, myelocytes and metamyelocytes but does not include bands. Percent differential counts (%) should be interpreted in the context of the absolute cell counts (cells/UL).    Lymphocytes % 03/06/2024 22.7  13.0 - 44.0 % Final    Monocytes % 03/06/2024 7.5  2.0 - 10.0 % Final    Eosinophils % 03/06/2024 5.1  0.0 - 6.0 % Final    Basophils % 03/06/2024 1.3  0.0 - 2.0 % Final    Neutrophils Absolute 03/06/2024 4.81  1.60 - 5.50 x10*3/uL Final    Percent differential counts (%) should be interpreted in the context of the absolute cell counts (cells/uL).    Immature Granulocytes Absolute, Au* 03/06/2024 0.01  0.00 - 0.50 x10*3/uL Final    Lymphocytes Absolute 03/06/2024 1.73  0.80 - 3.00 x10*3/uL Final    Monocytes Absolute 03/06/2024 0.57  0.05 - 0.80 x10*3/uL Final    Eosinophils Absolute 03/06/2024 0.39  0.00 - 0.40 x10*3/uL Final    Basophils Absolute 03/06/2024 0.10  0.00 - 0.10 x10*3/uL Final    Creatine Kinase 03/06/2024 59  0 - 215 U/L Final    JOSE EDUARDO 03/06/2024 Positive (A)  Negative Final    The Antinuclear Antibody (JOSE EDUARDO) test was performed using  indirect immunofluorescence assay with HEp-2 cells slide.    JOSE EDUARDO Pattern 03/06/2024 Homogeneous   Final    JOSE EDUARDO Titer 03/06/2024 1:160   Final    Citrulline Antibody, IgG 03/06/2024 <1  <3 U/mL Final    NEGATIVE < 3 U/ML  POSITIVE >=3 U/ML    Glucose 03/06/2024 72 (L)  74 - 99 mg/dL Final    Sodium 03/06/2024 139  136 - 145 mmol/L Final    Potassium 03/06/2024 4.3  3.5 - 5.3 mmol/L Final    Chloride 03/06/2024 104  98 - 107 mmol/L Final    Bicarbonate 03/06/2024 29  21 - 32 mmol/L Final    Anion Gap 03/06/2024 10  10 - 20 mmol/L Final     Urea Nitrogen 03/06/2024 22  6 - 23 mg/dL Final    Creatinine 03/06/2024 0.62  0.50 - 1.05 mg/dL Final    eGFR 03/06/2024 >90  >60 mL/min/1.73m*2 Final    Calculations of estimated GFR are performed using the 2021 CKD-EPI Study Refit equation without the race variable for the IDMS-Traceable creatinine methods.  https://jasn.asnjournals.org/content/early/2021/09/22/ASN.2092538064    Calcium 03/06/2024 9.4  8.6 - 10.3 mg/dL Final    Albumin 03/06/2024 4.0  3.4 - 5.0 g/dL Final    Alkaline Phosphatase 03/06/2024 73  33 - 136 U/L Final    Total Protein 03/06/2024 6.9  6.4 - 8.2 g/dL Final    AST 03/06/2024 20  9 - 39 U/L Final    Bilirubin, Total 03/06/2024 0.6  0.0 - 1.2 mg/dL Final    ALT 03/06/2024 14  7 - 45 U/L Final    Patients treated with Sulfasalazine may generate falsely decreased results for ALT.    Sedimentation Rate 03/06/2024 35 (H)  0 - 30 mm/h Final    Rheumatoid Factor 03/06/2024 <10  0 - 15 IU/mL Final    C-Reactive Protein 03/06/2024 1.44 (H)  <1.00 mg/dL Final    BNP 03/06/2024 15  0 - 99 pg/mL Final    D-Dimer, Quantitative VTE Exclusion 03/06/2024 1,719 (H)  <=500 ng/mL FEU Final    Total Protein 03/06/2024 7.1  6.4 - 8.2 g/dL Final    Albumin 03/06/2024 3.9  3.4 - 5.0 g/dL Final    Alpha 1 Globulin 03/06/2024 0.3  0.2 - 0.6 g/dL Final    Alpha 2 Globulin 03/06/2024 0.8  0.4 - 1.1 g/dL Final    Beta Globulin 03/06/2024 0.9  0.5 - 1.2 g/dL Final    Gamma 03/06/2024 1.1  0.5 - 1.4 g/dL Final    Protein Electrophoresis Comment 03/06/2024 Normal.   Final    Immunofixation Comment 03/06/2024 No monoclonal protein detected by immunofixation.   Final    Path Review - Serum Protein Electr* 03/06/2024 Reviewed and approved by MICHOACANO PARISI on 3/12/24 at 3:53 PM.       Final    Path Review - Serum Immunofixation 03/06/2024 Reviewed and approved by MICHOACANO PARISI on 3/12/24 at 3:53 PM.       Final    Thyroid Stimulating Hormone 03/06/2024 1.57  0.44 - 3.98 mIU/L Final    Anti-SM 03/06/2024 <0.2   <1.0 AI Final    < 1.0 = NEGATIVE  >=1.0 = POSITIVE    Anti-RNP 03/06/2024 0.4  <1.0 AI Final    < 1.0 = NEGATIVE  >=1.0 = POSITIVE    Anti-SM/RNP 03/06/2024 <0.2  <1.0 AI Final    < 1.0 = NEGATIVE  >=1.0 = POSITIVE    Anti-SSA 03/06/2024 <0.2  <1.0 AI Final    < 1.0 = NEGATIVE  >=1.0 = POSITIVE    Anti-SSB 03/06/2024 <0.2  <1.0 AI Final    < 1.0 = NEGATIVE  >=1.0 = POSITIVE    Anti-SCL-70 03/06/2024 <0.2  <1.0 AI Final    < 1.0 = NEGATIVE  >=1.0 = POSITIVE    Anti-CRITSI-1 IgG 03/06/2024 <0.2  <1.0 AI Final    < 1.0 = NEGATIVE  >=1.0 = POSITIVE    Anti-Chromatin 03/06/2024 <0.2  <1.0 AI Final    < 1.0 = NEGATIVE  >=1.0 = POSITIVE    Anti-Centromere 03/06/2024 <0.2  <1.0 AI Final    < 1.0 = NEGATIVE  >=1.0 = POSITIVE    ANTI-RIBOSOMAL P 03/06/2024 <0.2  <1.0 AI Final    < 1.0 = NEGATIVE  >=1.0 = POSITIVE    Anti-DNA (DS) 03/06/2024 2.0  <5.0 IU/mL Final    NEGATIVE: <= 4 IU/ML  EQUIVOCAL: 5- 9 IU/ML  POSITIVE: >=10 IU/ML      === 03/07/24 ===    CT ANGIO CHEST FOR PULMONARY EMBOLISM    - Impression -  No pulmonary embolism or acute cardiopulmonary process.    Right middle lobe and lower lobe nodules measuring up to 10 mm.  Please see below recommendations.    1.5 cm right lobe thyroid nodule. Further evaluation with nonemergent  thyroid ultrasound is recommended.    MACRO:  Incidental Finding:  Multiple solid non-calcified pulmonary nodules  measuring up to greater than 8 mm.  (**-YCF-**)    Instructions:  Consider follow up non contrast chest CT at 3-6  months, then consider CT chest at 18-24 months. (Yung Anders et  al., Guidelines for management of incidental pulmonary nodules  detected on CT images: From the Fleischner Society 2017, Radiology.  2017 Jul;284 (1):228-243.) [id number]    Signed by: Lauren Cuevas 3/7/2024 6:24 PM  Dictation workstation:   [id number]     === 03/08/24 ===    US THYROID    - Impression -  Multinodular goiter as described without suspicious findings.    Please note that these  statements are based on the recommendations of  the American College of Radiology    TI-RADS grading system. ACR TI-RADS recommendations (apply to nodules  which have NOT been biopsied):    TR5 7 or more points) highly suspicious - FNA if equal to or greater  than 1cm, follow-up if equal to or greater than 0.5cm every year for  5 years. Aggregate cancer risk 35%. TR4 (4-6 points) moderately  suspicious - FNA if equal to or greater than 1.5cm, follow-up if  Equal to or greater than 1 cm in 1, 2, 3 and 5 years. Aggregate  cancer risk 9.1% TR3 (3 points) mildly suspicious - FNA if equal to  or greater than 2.5cm, follow-up if equal to or greater than 1.5 cm  in 1, 3 and 5 years. Aggregate cancer risk 4.8% TR2 (2 points) not  suspicious. Aggregate cancer risk 1.5% TR1 (0 points) benign - No FNA  or follow-up. Aggregate cancer risk  0.3%    Signed by: Champ Maya 3/8/2024 1:28 PM  Dictation workstation:   [id number]   Assessment/Plan   Problem List Items Addressed This Visit       Polymyalgia (CMS/HCC)     Will continue with the Celebrex but add a Pepcid to be taken prior to the Celebrex with food.           Relevant Medications    celecoxib (CeleBREX) 200 mg capsule    Other Relevant Orders    Referral to Rheumatology    Polyarthralgia     Will keep the scheduled appointment with Dr. Luna but see if patient can get in with another provider sooner.         Relevant Medications    celecoxib (CeleBREX) 200 mg capsule    Other Relevant Orders    Referral to Rheumatology     Other Visit Diagnoses       Gastroesophageal reflux disease without esophagitis        Relevant Medications    famotidine (Pepcid) 20 mg tablet          Scribe Attestation  By signing my name below, IEnid Scribe   attest that this documentation has been prepared under the direction and in the presence of Mat Tatum MD.

## 2024-03-16 ENCOUNTER — PHARMACY VISIT (OUTPATIENT)
Dept: PHARMACY | Facility: CLINIC | Age: 74
End: 2024-03-16
Payer: MEDICARE

## 2024-03-16 ASSESSMENT — ENCOUNTER SYMPTOMS
ABDOMINAL PAIN: 0
COUGH: 0
HEADACHES: 0
SHORTNESS OF BREATH: 0
DIZZINESS: 0
HEMATURIA: 0
VOMITING: 0
SORE THROAT: 0
CONSTIPATION: 0
FREQUENCY: 0
TREMORS: 0
FEVER: 0
WHEEZING: 0
DYSURIA: 0
NUMBNESS: 0
PALPITATIONS: 0
CHILLS: 0
RHINORRHEA: 0
DIARRHEA: 0

## 2024-03-20 ENCOUNTER — PHARMACY VISIT (OUTPATIENT)
Dept: PHARMACY | Facility: CLINIC | Age: 74
End: 2024-03-20
Payer: MEDICARE

## 2024-03-20 PROCEDURE — RXMED WILLOW AMBULATORY MEDICATION CHARGE

## 2024-03-20 RX ORDER — PREDNISONE 5 MG/1
15 TABLET ORAL DAILY
Qty: 90 TABLET | Refills: 0 | OUTPATIENT
Start: 2024-03-20 | End: 2024-04-08 | Stop reason: SDUPTHER

## 2024-03-20 RX ORDER — HYDROXYCHLOROQUINE SULFATE 200 MG/1
400 TABLET, FILM COATED ORAL DAILY
Qty: 180 TABLET | Refills: 1 | OUTPATIENT
Start: 2024-03-20

## 2024-03-20 RX ORDER — SULFASALAZINE 500 MG/1
1000 TABLET ORAL 2 TIMES DAILY
Qty: 120 TABLET | Refills: 2 | OUTPATIENT
Start: 2024-03-20

## 2024-04-01 ENCOUNTER — PHARMACY VISIT (OUTPATIENT)
Dept: PHARMACY | Facility: CLINIC | Age: 74
End: 2024-04-01
Payer: MEDICARE

## 2024-04-01 PROCEDURE — RXMED WILLOW AMBULATORY MEDICATION CHARGE

## 2024-04-15 PROCEDURE — RXMED WILLOW AMBULATORY MEDICATION CHARGE

## 2024-04-17 ENCOUNTER — PHARMACY VISIT (OUTPATIENT)
Dept: PHARMACY | Facility: CLINIC | Age: 74
End: 2024-04-17
Payer: MEDICARE

## 2024-04-19 PROCEDURE — RXMED WILLOW AMBULATORY MEDICATION CHARGE

## 2024-04-22 ENCOUNTER — PHARMACY VISIT (OUTPATIENT)
Dept: PHARMACY | Facility: CLINIC | Age: 74
End: 2024-04-22
Payer: COMMERCIAL

## 2024-04-23 ENCOUNTER — PHARMACY VISIT (OUTPATIENT)
Dept: PHARMACY | Facility: CLINIC | Age: 74
End: 2024-04-23
Payer: MEDICARE

## 2024-04-23 PROCEDURE — RXMED WILLOW AMBULATORY MEDICATION CHARGE

## 2024-04-24 ENCOUNTER — APPOINTMENT (OUTPATIENT)
Dept: NEUROLOGY | Facility: CLINIC | Age: 74
End: 2024-04-24
Payer: MEDICARE

## 2024-05-01 ENCOUNTER — PHARMACY VISIT (OUTPATIENT)
Dept: PHARMACY | Facility: CLINIC | Age: 74
End: 2024-05-01
Payer: MEDICARE

## 2024-05-01 PROCEDURE — RXMED WILLOW AMBULATORY MEDICATION CHARGE

## 2024-05-01 RX ORDER — HYDROCODONE BITARTRATE AND ACETAMINOPHEN 5; 325 MG/1; MG/1
1 TABLET ORAL EVERY 8 HOURS PRN
Qty: 10 TABLET | Refills: 0 | OUTPATIENT
Start: 2024-05-01

## 2024-05-02 ENCOUNTER — APPOINTMENT (OUTPATIENT)
Dept: RHEUMATOLOGY | Facility: CLINIC | Age: 74
End: 2024-05-02
Payer: MEDICARE

## 2024-05-11 DIAGNOSIS — K21.9 GASTROESOPHAGEAL REFLUX DISEASE WITHOUT ESOPHAGITIS: ICD-10-CM

## 2024-05-11 PROCEDURE — RXMED WILLOW AMBULATORY MEDICATION CHARGE

## 2024-05-12 RX ORDER — FAMOTIDINE 20 MG/1
20 TABLET, FILM COATED ORAL DAILY
Qty: 30 TABLET | Refills: 1 | Status: SHIPPED | OUTPATIENT
Start: 2024-05-12

## 2024-05-14 ENCOUNTER — PHARMACY VISIT (OUTPATIENT)
Dept: PHARMACY | Facility: CLINIC | Age: 74
End: 2024-05-14
Payer: COMMERCIAL

## 2024-05-16 ENCOUNTER — OFFICE VISIT (OUTPATIENT)
Dept: PRIMARY CARE | Facility: CLINIC | Age: 74
End: 2024-05-16
Payer: MEDICARE

## 2024-05-16 VITALS
HEART RATE: 84 BPM | WEIGHT: 178 LBS | SYSTOLIC BLOOD PRESSURE: 144 MMHG | OXYGEN SATURATION: 99 % | TEMPERATURE: 96.2 F | RESPIRATION RATE: 16 BRPM | DIASTOLIC BLOOD PRESSURE: 70 MMHG | BODY MASS INDEX: 30.39 KG/M2 | HEIGHT: 64 IN

## 2024-05-16 DIAGNOSIS — G57.01 PIRIFORMIS SYNDROME, RIGHT: Primary | ICD-10-CM

## 2024-05-16 DIAGNOSIS — M81.0 AGE RELATED OSTEOPOROSIS, UNSPECIFIED PATHOLOGICAL FRACTURE PRESENCE: ICD-10-CM

## 2024-05-16 PROCEDURE — 99213 OFFICE O/P EST LOW 20 MIN: CPT | Performed by: FAMILY MEDICINE

## 2024-05-16 PROCEDURE — 1160F RVW MEDS BY RX/DR IN RCRD: CPT | Performed by: FAMILY MEDICINE

## 2024-05-16 PROCEDURE — 1123F ACP DISCUSS/DSCN MKR DOCD: CPT | Performed by: FAMILY MEDICINE

## 2024-05-16 PROCEDURE — 1159F MED LIST DOCD IN RCRD: CPT | Performed by: FAMILY MEDICINE

## 2024-05-16 PROCEDURE — 1036F TOBACCO NON-USER: CPT | Performed by: FAMILY MEDICINE

## 2024-05-16 ASSESSMENT — ENCOUNTER SYMPTOMS
NUMBNESS: 1
CHILLS: 0
WEAKNESS: 1
ABDOMINAL PAIN: 0
VOMITING: 0
WHEEZING: 0
FREQUENCY: 0
SORE THROAT: 0
HEMATURIA: 0
HEADACHES: 0
DIZZINESS: 0
PALPITATIONS: 0
SHORTNESS OF BREATH: 0
FEVER: 0
RHINORRHEA: 0
COUGH: 0
DYSURIA: 0
CONSTIPATION: 0
TREMORS: 0
DIARRHEA: 0

## 2024-05-16 ASSESSMENT — PATIENT HEALTH QUESTIONNAIRE - PHQ9
1. LITTLE INTEREST OR PLEASURE IN DOING THINGS: NOT AT ALL
2. FEELING DOWN, DEPRESSED OR HOPELESS: NOT AT ALL
SUM OF ALL RESPONSES TO PHQ9 QUESTIONS 1 AND 2: 0

## 2024-05-16 NOTE — PROGRESS NOTES
"Subjective   Patient ID: Rachel Mann is a 73 y.o. female who presents for Back Pain.    She complains of back pain. The pain is present in the right buttocks. The quality of the pain is described as aching, burning, cramping, shooting and stabbing. The pain is at a severity of 5/10. The pain is moderate. The pain has been Constant since onset. Associated symptoms include a loss of motion, a loss of sensation, muscle weakness, numbness and tingling. Pertinent negatives include no inability to bear weight. She reports no foreign bodies present. The symptoms are aggravated by movement and weight bearing. She has tried nothing for the symptoms. The treatment provided no relief.        Review of Systems   Constitutional:  Negative for chills and fever.   HENT:  Negative for congestion, ear pain, nosebleeds, rhinorrhea and sore throat.    Respiratory:  Negative for cough, shortness of breath and wheezing.    Cardiovascular:  Negative for chest pain, palpitations and leg swelling.   Gastrointestinal:  Negative for abdominal pain, constipation, diarrhea and vomiting.   Genitourinary:  Negative for dysuria, frequency and hematuria.   Musculoskeletal:  Positive for gait problem.   Neurological:  Positive for weakness and numbness. Negative for dizziness, tremors and headaches.       Objective   /70   Pulse 84   Temp 35.7 °C (96.2 °F)   Resp 16   Ht 1.626 m (5' 4\")   Wt 80.7 kg (178 lb)   SpO2 99%   BMI 30.55 kg/m²     Physical Exam  Constitutional:       General: She is not in acute distress.     Appearance: Normal appearance.   HENT:      Head: Normocephalic and atraumatic.      Mouth/Throat:      Mouth: Mucous membranes are moist.      Pharynx: Oropharynx is clear. No oropharyngeal exudate or posterior oropharyngeal erythema.   Eyes:      General: No scleral icterus.     Extraocular Movements: Extraocular movements intact.      Pupils: Pupils are equal, round, and reactive to light.   Cardiovascular:      Rate " and Rhythm: Normal rate and regular rhythm.      Pulses: Normal pulses.      Heart sounds: No murmur heard.     No friction rub. No gallop.   Pulmonary:      Effort: Pulmonary effort is normal.      Breath sounds: No wheezing, rhonchi or rales.   Skin:     General: Skin is warm.      Coloration: Skin is not jaundiced or pale.      Findings: No erythema or rash.   Neurological:      General: No focal deficit present.      Mental Status: She is alert and oriented to person, place, and time.      Cranial Nerves: No cranial nerve deficit.      Sensory: No sensory deficit.      Motor: Weakness present.      Coordination: Coordination normal.      Gait: Gait abnormal.         Assessment/Plan   Problem List Items Addressed This Visit       Osteoporosis     Chronic Condition Documentation : Stable based on symptoms and exam.  Continue established treatment plan and follow-up at least yearly.          Piriformis syndrome, right - Primary       Natural history and expected course discussed. Questions answered.  Proper lifting, bending technique discussed.  Stretching exercises discussed.  Regular aerobic and trunk strengthening exercises discussed.  Ice to affected area as needed for local pain relief.  Heat to affected area as needed for local pain relief.          Relevant Orders    Referral to Pain Medicine     Scribe Attestation  By signing my name below, David STRONG Scribe   attest that this documentation has been prepared under the direction and in the presence of Mat Tatum MD.    Patient was identified as a fall risk. Risk prevention instructions provided.

## 2024-05-16 NOTE — ASSESSMENT & PLAN NOTE
Natural history and expected course discussed. Questions answered.  Proper lifting, bending technique discussed.  Stretching exercises discussed.  Regular aerobic and trunk strengthening exercises discussed.  Ice to affected area as needed for local pain relief.  Heat to affected area as needed for local pain relief.

## 2024-05-17 PROCEDURE — RXMED WILLOW AMBULATORY MEDICATION CHARGE

## 2024-05-20 ENCOUNTER — PHARMACY VISIT (OUTPATIENT)
Dept: PHARMACY | Facility: CLINIC | Age: 74
End: 2024-05-20
Payer: MEDICARE

## 2024-05-31 ENCOUNTER — APPOINTMENT (OUTPATIENT)
Dept: PRIMARY CARE | Facility: CLINIC | Age: 74
End: 2024-05-31
Payer: MEDICARE

## 2024-06-06 ENCOUNTER — TELEPHONE (OUTPATIENT)
Dept: PAIN MANAGEMENT | Age: 74
End: 2024-06-06

## 2024-06-06 ENCOUNTER — INITIAL CONSULT (OUTPATIENT)
Dept: PAIN MANAGEMENT | Age: 74
End: 2024-06-06
Payer: MEDICARE

## 2024-06-06 VITALS
HEIGHT: 65 IN | WEIGHT: 174 LBS | BODY MASS INDEX: 28.99 KG/M2 | SYSTOLIC BLOOD PRESSURE: 136 MMHG | DIASTOLIC BLOOD PRESSURE: 82 MMHG | TEMPERATURE: 97.9 F

## 2024-06-06 DIAGNOSIS — M47.817 LUMBOSACRAL SPONDYLOSIS WITHOUT MYELOPATHY: ICD-10-CM

## 2024-06-06 DIAGNOSIS — M54.16 LUMBAR RADICULOPATHY: Primary | ICD-10-CM

## 2024-06-06 DIAGNOSIS — M16.11 PRIMARY OSTEOARTHRITIS OF RIGHT HIP: ICD-10-CM

## 2024-06-06 PROCEDURE — 1090F PRES/ABSN URINE INCON ASSESS: CPT | Performed by: PAIN MEDICINE

## 2024-06-06 PROCEDURE — G8419 CALC BMI OUT NRM PARAM NOF/U: HCPCS | Performed by: PAIN MEDICINE

## 2024-06-06 PROCEDURE — 3075F SYST BP GE 130 - 139MM HG: CPT | Performed by: PAIN MEDICINE

## 2024-06-06 PROCEDURE — 1036F TOBACCO NON-USER: CPT | Performed by: PAIN MEDICINE

## 2024-06-06 PROCEDURE — 3079F DIAST BP 80-89 MM HG: CPT | Performed by: PAIN MEDICINE

## 2024-06-06 PROCEDURE — G8427 DOCREV CUR MEDS BY ELIG CLIN: HCPCS | Performed by: PAIN MEDICINE

## 2024-06-06 PROCEDURE — G8399 PT W/DXA RESULTS DOCUMENT: HCPCS | Performed by: PAIN MEDICINE

## 2024-06-06 PROCEDURE — 3017F COLORECTAL CA SCREEN DOC REV: CPT | Performed by: PAIN MEDICINE

## 2024-06-06 PROCEDURE — 1123F ACP DISCUSS/DSCN MKR DOCD: CPT | Performed by: PAIN MEDICINE

## 2024-06-06 PROCEDURE — 99204 OFFICE O/P NEW MOD 45 MIN: CPT | Performed by: PAIN MEDICINE

## 2024-06-06 RX ORDER — PREDNISONE 5 MG/1
TABLET ORAL
COMMUNITY
Start: 2024-04-19 | End: 2024-06-14

## 2024-06-06 RX ORDER — CELECOXIB 200 MG/1
200 CAPSULE ORAL DAILY
COMMUNITY
Start: 2024-03-15

## 2024-06-06 RX ORDER — HYDROXYCHLOROQUINE SULFATE 200 MG/1
400 TABLET, FILM COATED ORAL DAILY
COMMUNITY
Start: 2024-03-20

## 2024-06-06 RX ORDER — SULFASALAZINE 500 MG/1
1000 TABLET ORAL 2 TIMES DAILY
COMMUNITY
Start: 2024-03-20

## 2024-06-06 RX ORDER — PREGABALIN 50 MG/1
50 CAPSULE ORAL 3 TIMES DAILY
Qty: 90 CAPSULE | Refills: 0 | Status: SHIPPED | OUTPATIENT
Start: 2024-06-06 | End: 2024-07-06

## 2024-06-06 RX ORDER — FOLIC ACID 1 MG/1
1 TABLET ORAL DAILY
COMMUNITY
Start: 2024-04-19

## 2024-06-06 RX ORDER — METHOTREXATE 2.5 MG/1
15 TABLET ORAL
COMMUNITY
Start: 2024-04-19

## 2024-06-06 NOTE — PROGRESS NOTES
Take 2 tablets by mouth daily      sulfaSALAzine (AZULFIDINE) 500 MG tablet Take 2 tablets by mouth 2 times daily      predniSONE (DELTASONE) 5 MG tablet Take 2 tablets by mouth (10 mg) once daily for 14 days, THEN 1 and 1/2 tablets (7.5 mg) once daily for 14 days, THEN 1 tablet (5 mg) once daily for 14 days, THEN 0.5 tablets (2.5 mg) once daily for 14 days.      methotrexate (RHEUMATREX) 2.5 MG chemo tablet Take 6 tablets by mouth every 7 days      celecoxib (CELEBREX) 200 MG capsule Take 1 capsule by mouth daily      folic acid (FOLVITE) 1 MG tablet Take 1 tablet by mouth daily      MAGNESIUM PO Take by mouth      Misc. Devices MISC AFO STEP SMART FOOT BRACE 1 each 0    Vitamin D (CHOLECALCIFEROL) 1000 UNITS CAPS capsule Take 2 capsules by mouth daily      Calcium Carbonate-Vit D-Min (CALCIUM 1200 PO) Take  by mouth.      multivitamin (THERAGRAN) per tablet Take 1 tablet by mouth daily      famotidine (PEPCID) 20 MG tablet Take 1 tablet by mouth 2 times daily      Cyanocobalamin (VITAMIN B 12 PO) Take 1 tablet by mouth daily.         No current facility-administered medications on file prior to visit.           HPI    Review of Systems   All other systems reviewed and are negative.        Objective:     Vitals:  /82 (Site: Left Upper Arm, Position: Sitting, Cuff Size: Large Adult)   Temp 97.9 °F (36.6 °C) (Temporal)   Ht 1.651 m (5' 5\")   Wt 78.9 kg (174 lb)   BMI 28.96 kg/m² Pain Score:   8      Physical Exam    Patient is AO X 3 , recent and remote memory is intact,mood and affect normal,judgement and insight normal. Head normacephalic,eyes - PERRLA, EOMI, No obvious external Ears, Nose, mouth masses seen, neck supple, trachae midline, no abnormal lymph nodes palpated in neck or supraclavicular region. CN's 2-12 grossly intact. Strength functional for ambulation, balance functional, coordination normal. Visualized skin intact, no obvious lesion or visualized cyanosis. No swelling. Pulses intact. No

## 2024-06-06 NOTE — TELEPHONE ENCOUNTER
MR. QUINONES FOUND INFORMATION ON PT'S INJECTION SHE HAD DONE AT  THROUGH HER  MYCHART.  L-3, L-4 NEUROFORAMEN WAS PERFORMED USING LIDOCANE.  MR. QUINONES STATED SK SEEMED A LITTLE CONCERNED ABOUT IT.

## 2024-06-07 ENCOUNTER — TELEPHONE (OUTPATIENT)
Dept: PAIN MANAGEMENT | Age: 74
End: 2024-06-07

## 2024-06-07 DIAGNOSIS — M16.11 PRIMARY OSTEOARTHRITIS OF RIGHT HIP: ICD-10-CM

## 2024-06-07 DIAGNOSIS — M54.16 LUMBAR RADICULOPATHY: ICD-10-CM

## 2024-06-07 PROCEDURE — 72110 X-RAY EXAM L-2 SPINE 4/>VWS: CPT | Performed by: PAIN MEDICINE

## 2024-06-07 PROCEDURE — RXMED WILLOW AMBULATORY MEDICATION CHARGE

## 2024-06-07 PROCEDURE — 73502 X-RAY EXAM HIP UNI 2-3 VIEWS: CPT | Performed by: PAIN MEDICINE

## 2024-06-07 NOTE — TELEPHONE ENCOUNTER
Prior authorization has been started for Lyrica 50 mg on Cover My Meds, clinical uploaded, authorization pending Key: [id number]  [place] Case ID #: [id number]  : The requested drug is not on your plan's formulary (list of covered drugs). Your Medicare Part D drug plan was asked to cover a drug that is not on the formulary (this is called a formulary exception). Your prescriber did not provide the detailed information that is required in order to approve the request. To receive a formulary exception, per the Part D Coverage Determination guidance Section 40.5.2 and 40.5.3, your prescriber must provide information that documents at least one of the following has occurred: - You have tried the formulary drugs for the treatment of your condition and they did not work .Talk to your prescriber to see if the following covered alternative(s) would be right for you: Pregabalin capsule Different quantity limits apply depending on the strength of the medication prescribed. Please consult the Medicare Part D plan’s formulary for the specific quantity limit, Pregabalin oral solution Quantity limit of 900ml per 30 days, Prior Authorization required Pregabalin ER tablet Gabapentin capsule Different quantity limits apply depending on the strength of the medication prescribed. Please consult the Medicare Part D plan’s formulary for the specific quantity limit Gabapentin oral solution Quantity limit of 2160 ml per 30 days Gabapentin tablet Different quantity limits apply depending on the strength of the medication prescribed. Please consult the Medicare Part D plan’s formulary for the specific quantity limit  How would Dr. Aly want to proceed?

## 2024-06-10 ENCOUNTER — PHARMACY VISIT (OUTPATIENT)
Dept: PHARMACY | Facility: CLINIC | Age: 74
End: 2024-06-10
Payer: MEDICARE

## 2024-06-10 ENCOUNTER — TELEPHONE (OUTPATIENT)
Dept: PAIN MANAGEMENT | Age: 74
End: 2024-06-10

## 2024-06-10 PROCEDURE — RXMED WILLOW AMBULATORY MEDICATION CHARGE

## 2024-06-10 NOTE — TELEPHONE ENCOUNTER
RIGHT L4-5 L5-S1 RICHARD     NO AUTH REQUIRED    OK to schedule procedure approved as above.   Please note sides/levels approved and date range.   (If applicable, sides/levels approved may differ from those ordered)    TO BE SCHEDULED WITH DR MONTGOMERY @ [facility]

## 2024-06-11 DIAGNOSIS — M35.3 POLYMYALGIA (MULTI): ICD-10-CM

## 2024-06-11 DIAGNOSIS — M25.50 POLYARTHRALGIA: ICD-10-CM

## 2024-06-12 PROCEDURE — RXMED WILLOW AMBULATORY MEDICATION CHARGE

## 2024-06-12 RX ORDER — CELECOXIB 200 MG/1
200 CAPSULE ORAL DAILY
Qty: 30 CAPSULE | Refills: 1 | Status: SHIPPED | OUTPATIENT
Start: 2024-06-12

## 2024-06-13 ENCOUNTER — PHARMACY VISIT (OUTPATIENT)
Dept: PHARMACY | Facility: CLINIC | Age: 74
End: 2024-06-13
Payer: MEDICARE

## 2024-06-17 RX ORDER — GABAPENTIN 300 MG/1
300 CAPSULE ORAL 3 TIMES DAILY PRN
Qty: 90 CAPSULE | Refills: 0 | Status: SHIPPED | OUTPATIENT
Start: 2024-06-17 | End: 2024-07-17

## 2024-06-18 ENCOUNTER — HOSPITAL ENCOUNTER (OUTPATIENT)
Dept: MRI IMAGING | Age: 74
Discharge: HOME | End: 2024-06-20
Payer: MEDICARE

## 2024-06-18 DIAGNOSIS — M54.16 LUMBAR RADICULOPATHY: ICD-10-CM

## 2024-06-18 PROCEDURE — A9579 GAD-BASE MR CONTRAST NOS,1ML: HCPCS | Performed by: PAIN MEDICINE

## 2024-06-18 PROCEDURE — 72158 MRI LUMBAR SPINE W/O & W/DYE: CPT

## 2024-06-18 PROCEDURE — 6360000004 HC RX CONTRAST MEDICATION: Performed by: PAIN MEDICINE

## 2024-06-18 RX ADMIN — GADOTERIDOL 20 ML: 279.3 INJECTION, SOLUTION INTRAVENOUS at 12:43

## 2024-06-20 ENCOUNTER — OFFICE VISIT (OUTPATIENT)
Dept: PAIN MANAGEMENT | Age: 74
End: 2024-06-20
Payer: MEDICARE

## 2024-06-20 VITALS
SYSTOLIC BLOOD PRESSURE: 120 MMHG | DIASTOLIC BLOOD PRESSURE: 68 MMHG | WEIGHT: 174 LBS | BODY MASS INDEX: 28.99 KG/M2 | HEIGHT: 65 IN | TEMPERATURE: 96.9 F

## 2024-06-20 DIAGNOSIS — M54.16 LUMBAR RADICULOPATHY: Primary | ICD-10-CM

## 2024-06-20 DIAGNOSIS — M16.11 PRIMARY OSTEOARTHRITIS OF RIGHT HIP: ICD-10-CM

## 2024-06-20 DIAGNOSIS — M47.817 LUMBOSACRAL SPONDYLOSIS WITHOUT MYELOPATHY: ICD-10-CM

## 2024-06-20 PROCEDURE — 1036F TOBACCO NON-USER: CPT | Performed by: PAIN MEDICINE

## 2024-06-20 PROCEDURE — G8427 DOCREV CUR MEDS BY ELIG CLIN: HCPCS | Performed by: PAIN MEDICINE

## 2024-06-20 PROCEDURE — 3017F COLORECTAL CA SCREEN DOC REV: CPT | Performed by: PAIN MEDICINE

## 2024-06-20 PROCEDURE — G8399 PT W/DXA RESULTS DOCUMENT: HCPCS | Performed by: PAIN MEDICINE

## 2024-06-20 PROCEDURE — 99214 OFFICE O/P EST MOD 30 MIN: CPT | Performed by: PAIN MEDICINE

## 2024-06-20 PROCEDURE — 1090F PRES/ABSN URINE INCON ASSESS: CPT | Performed by: PAIN MEDICINE

## 2024-06-20 PROCEDURE — 1123F ACP DISCUSS/DSCN MKR DOCD: CPT | Performed by: PAIN MEDICINE

## 2024-06-20 PROCEDURE — G8419 CALC BMI OUT NRM PARAM NOF/U: HCPCS | Performed by: PAIN MEDICINE

## 2024-06-20 PROCEDURE — 3078F DIAST BP <80 MM HG: CPT | Performed by: PAIN MEDICINE

## 2024-06-20 PROCEDURE — 3074F SYST BP LT 130 MM HG: CPT | Performed by: PAIN MEDICINE

## 2024-06-20 NOTE — PROGRESS NOTES
30 days. Start at night Max Daily Amount: 150 mg 90 capsule 0    MAGNESIUM PO Take by mouth      Misc. Devices MISC AFO STEP SMART FOOT BRACE 1 each 0    Vitamin D (CHOLECALCIFEROL) 1000 UNITS CAPS capsule Take 2 capsules by mouth daily      Calcium Carbonate-Vit D-Min (CALCIUM 1200 PO) Take  by mouth.      multivitamin (THERAGRAN) per tablet Take 1 tablet by mouth daily      famotidine (PEPCID) 20 MG tablet Take 1 tablet by mouth 2 times daily      Cyanocobalamin (VITAMIN B 12 PO) Take 1 tablet by mouth daily.         No current facility-administered medications on file prior to visit.           HPI    Review of Systems   All other systems reviewed and are negative.        Objective:     Vitals:  Ht 1.651 m (5' 5\")   Wt 78.9 kg (174 lb)   BMI 28.96 kg/m² Pain Score:  10 - Worst pain ever      Physical Exam    Patient alert and oriented times three, recent and remote memory intact, mood and affect normal, judgement and insight normal. Strength functional for ambulation. Balance and coordinational functional. Visualized skin intact. No visualized cyanosis, pulses intact. Cranial nerves 2-12 grossly intact. No obvious upper motor neuron signs seen. Sensation intact to light touch.+RLE RLE    Assessment:         Plan:

## 2024-06-24 ENCOUNTER — PROCEDURE VISIT (OUTPATIENT)
Age: 74
End: 2024-06-24
Payer: MEDICARE

## 2024-06-24 VITALS
SYSTOLIC BLOOD PRESSURE: 134 MMHG | BODY MASS INDEX: 28.99 KG/M2 | HEART RATE: 86 BPM | DIASTOLIC BLOOD PRESSURE: 74 MMHG | HEIGHT: 65 IN | TEMPERATURE: 97.9 F | WEIGHT: 174 LBS | OXYGEN SATURATION: 97 %

## 2024-06-24 DIAGNOSIS — M54.16 LUMBAR RADICULOPATHY: Primary | ICD-10-CM

## 2024-06-24 PROCEDURE — 64483 NJX AA&/STRD TFRM EPI L/S 1: CPT | Performed by: PAIN MEDICINE

## 2024-06-24 PROCEDURE — 64484 NJX AA&/STRD TFRM EPI L/S EA: CPT | Performed by: PAIN MEDICINE

## 2024-06-24 RX ORDER — DEXAMETHASONE SODIUM PHOSPHATE 10 MG/ML
10 INJECTION, SOLUTION INTRAMUSCULAR; INTRAVENOUS ONCE
Status: COMPLETED | OUTPATIENT
Start: 2024-06-24 | End: 2024-06-24

## 2024-06-24 RX ADMIN — DEXAMETHASONE SODIUM PHOSPHATE 10 MG: 10 INJECTION, SOLUTION INTRAMUSCULAR; INTRAVENOUS at 09:04

## 2024-06-24 NOTE — PROGRESS NOTES
MyPermissions.  Spine Surgery  Advanced Pain Management      Patient Name: Lennie Reis : 1950  Date: 2024   Physician: MAUREEN MONTGOMERY DO      Lennie Reis  is here today for interventional pain management.    Preoperatively, the patient presents with radicular pain in the affected area as per history and exam. Patient has failed NSAIDs, PT, and conservative treatment. Patient has significant psychological and functional impairment due to this condition.  Standard ASIPP guidelines were followed and sterile technique used.  Area was cleaned with Betadine x3.  Informed consent was obtained.  Fluoroscopic guidance was used for this procedure.    TRANSFORAMINAL EPIDURAL  There was appropriate spread of contrast in the anterior epidural space and around the exiting nerve root. The 6 o’clock position of the pedicle was identified. Multiple views of fluoroscopy including lateral were used to confirm accurate needle placement of depth. Live fluoroscopy was used when injecting contrast to ensure no subdural or vascular spread. In total, approximately 5 mg of Dexamethasone and 1.0 ml of 0.9cc of normal saline was injected slowly without difficulty.    Patient tolerated the procedure well, no obvious complications occurred during the procedure.  Patient was appropriately monitored and discharged home in stable condition with their usual motor strength. Post Op instructions were given to patient. Patient will resume blood thinners after procedure if they stopped them before procedure. Relevant imaging was reviewed with patient.           [] Bilateral [] T12-L1 [] L3-4        [] L1-2 [x] L4-5       [x] Right [] L2-3 [x] L5-S1                [] Left                  MAUREEN MONTGOMERY DO      [address] [facility], [address] [place], [place] [address]  Phone [phone]/Fax [phone]/[email or web address]

## 2024-06-26 DIAGNOSIS — G57.01 PIRIFORMIS SYNDROME, RIGHT: ICD-10-CM

## 2024-07-04 DIAGNOSIS — K21.9 GASTROESOPHAGEAL REFLUX DISEASE WITHOUT ESOPHAGITIS: ICD-10-CM

## 2024-07-04 RX ORDER — FAMOTIDINE 20 MG/1
20 TABLET, FILM COATED ORAL DAILY
Qty: 30 TABLET | Refills: 2 | Status: SHIPPED | OUTPATIENT
Start: 2024-07-04

## 2024-07-05 ENCOUNTER — PHARMACY VISIT (OUTPATIENT)
Dept: PHARMACY | Facility: CLINIC | Age: 74
End: 2024-07-05
Payer: MEDICARE

## 2024-07-05 PROCEDURE — RXMED WILLOW AMBULATORY MEDICATION CHARGE

## 2024-07-05 RX ORDER — METHYLPREDNISOLONE 4 MG/1
TABLET ORAL
Qty: 21 TABLET | Refills: 0 | OUTPATIENT
Start: 2024-07-05

## 2024-07-06 PROCEDURE — RXMED WILLOW AMBULATORY MEDICATION CHARGE

## 2024-07-08 ENCOUNTER — APPOINTMENT (OUTPATIENT)
Dept: RADIOLOGY | Facility: HOSPITAL | Age: 74
End: 2024-07-08
Payer: MEDICARE

## 2024-07-09 ENCOUNTER — PHARMACY VISIT (OUTPATIENT)
Dept: PHARMACY | Facility: CLINIC | Age: 74
End: 2024-07-09
Payer: MEDICARE

## 2024-07-09 PROCEDURE — RXMED WILLOW AMBULATORY MEDICATION CHARGE

## 2024-07-09 RX ORDER — METHOTREXATE 25 MG/ML
20 INJECTION INTRA-ARTERIAL; INTRAMUSCULAR; INTRATHECAL; INTRAVENOUS
Qty: 8 ML | Refills: 3 | OUTPATIENT
Start: 2024-07-09

## 2024-07-10 ENCOUNTER — PHARMACY VISIT (OUTPATIENT)
Dept: PHARMACY | Facility: CLINIC | Age: 74
End: 2024-07-10
Payer: MEDICARE

## 2024-07-10 ENCOUNTER — HOSPITAL ENCOUNTER (OUTPATIENT)
Dept: RADIOLOGY | Facility: HOSPITAL | Age: 74
Discharge: HOME | End: 2024-07-10
Payer: MEDICARE

## 2024-07-10 DIAGNOSIS — R91.8 MULTIPLE LUNG NODULES ON CT: ICD-10-CM

## 2024-07-10 PROCEDURE — 71250 CT THORAX DX C-: CPT

## 2024-07-10 PROCEDURE — 71250 CT THORAX DX C-: CPT | Performed by: RADIOLOGY

## 2024-07-15 DIAGNOSIS — R91.8 LUNG NODULES: ICD-10-CM

## 2024-07-18 ENCOUNTER — PHARMACY VISIT (OUTPATIENT)
Dept: PHARMACY | Facility: CLINIC | Age: 74
End: 2024-07-18
Payer: MEDICARE

## 2024-07-18 ENCOUNTER — OFFICE VISIT (OUTPATIENT)
Dept: PAIN MANAGEMENT | Age: 74
End: 2024-07-18
Payer: MEDICARE

## 2024-07-18 VITALS
DIASTOLIC BLOOD PRESSURE: 70 MMHG | HEIGHT: 65 IN | WEIGHT: 174 LBS | TEMPERATURE: 97.4 F | SYSTOLIC BLOOD PRESSURE: 124 MMHG | BODY MASS INDEX: 28.99 KG/M2

## 2024-07-18 DIAGNOSIS — M54.16 LUMBAR RADICULOPATHY: Primary | ICD-10-CM

## 2024-07-18 PROCEDURE — RXMED WILLOW AMBULATORY MEDICATION CHARGE

## 2024-07-18 PROCEDURE — 1090F PRES/ABSN URINE INCON ASSESS: CPT | Performed by: PAIN MEDICINE

## 2024-07-18 PROCEDURE — 1036F TOBACCO NON-USER: CPT | Performed by: PAIN MEDICINE

## 2024-07-18 PROCEDURE — 3017F COLORECTAL CA SCREEN DOC REV: CPT | Performed by: PAIN MEDICINE

## 2024-07-18 PROCEDURE — G8427 DOCREV CUR MEDS BY ELIG CLIN: HCPCS | Performed by: PAIN MEDICINE

## 2024-07-18 PROCEDURE — 99214 OFFICE O/P EST MOD 30 MIN: CPT | Performed by: PAIN MEDICINE

## 2024-07-18 PROCEDURE — G8399 PT W/DXA RESULTS DOCUMENT: HCPCS | Performed by: PAIN MEDICINE

## 2024-07-18 PROCEDURE — G8419 CALC BMI OUT NRM PARAM NOF/U: HCPCS | Performed by: PAIN MEDICINE

## 2024-07-18 PROCEDURE — 1123F ACP DISCUSS/DSCN MKR DOCD: CPT | Performed by: PAIN MEDICINE

## 2024-07-18 PROCEDURE — 3078F DIAST BP <80 MM HG: CPT | Performed by: PAIN MEDICINE

## 2024-07-18 PROCEDURE — 3074F SYST BP LT 130 MM HG: CPT | Performed by: PAIN MEDICINE

## 2024-07-18 RX ORDER — METHOTREXATE 25 MG/ML
INJECTION, SOLUTION INTRA-ARTERIAL; INTRAMUSCULAR; INTRAVENOUS
COMMUNITY
Start: 2024-07-09

## 2024-07-18 RX ORDER — PREGABALIN 75 MG/1
75 CAPSULE ORAL 3 TIMES DAILY PRN
Qty: 90 CAPSULE | Refills: 0 | OUTPATIENT
Start: 2024-07-18

## 2024-07-18 RX ORDER — PREGABALIN 75 MG/1
75 CAPSULE ORAL 3 TIMES DAILY PRN
Qty: 90 CAPSULE | Refills: 0 | Status: SHIPPED | OUTPATIENT
Start: 2024-07-18 | End: 2024-08-17

## 2024-07-18 NOTE — PROGRESS NOTES
[facility]  [facility]  P: [phone]  F: [phone]        Lennie Reis  (1950)    7/18/2024    Subjective:     Lennie Reis is 73 y.o. female who complains today of:     Chief Complaint   Patient presents with    Follow-up    Hip Pain     right    Leg Pain     right    Foot Pain     right     Patient here today for follow-up.  She is here with her .  Unfortunately epidural injections did not help at all she continues to have low back pain pain down the right leg.  She is frustrated with the pain.  I reviewed her MRI report again.  History of back surgery many many years ago.  She has rheumatoid arthritis.  She on Lyrica 50 mg 3 times a day.  She takes Tylenol she says it does not help.  Standing walking bending bothers her.  A lot of right-sided low back pain as well.    Assessment: Lumbar radiculopathy, degenerative disc disease, lumbar spondylosis, chronic pain syndrome, rheumatoid arthritis, lateral recess stenosis lumbar spine    Plan: We did discussion.  Patient would like to see a surgeon will put an external referral for neurosurgery.  We did briefly talk about the fact that she may be a candidate for medial branch blocks and possibly RF ablation for the arthritic pain.  She wants to hold off on that right now.  I told her we do not have to use steroids for any of those procedures as well.  As her concern is that she does have osteoporosis.  She does not want anything stronger for pain as they do not help.  Will increase Lyrica to 75 mg 3 times a day #90 for nerve pain.  Follow-up as needed.  Questions answered chart was reviewed.  She is in agreement with plan.  OARRS was reviewed.    Allergies:  Patient has no known allergies.    Past Medical History:   Diagnosis Date    Chronic back pain     Disequilibrium     Headache(784.0)     Hematoma of abdominal wall     Hypertension     Low back pain     Malaise and fatigue

## 2024-07-30 ENCOUNTER — PHARMACY VISIT (OUTPATIENT)
Dept: PHARMACY | Facility: CLINIC | Age: 74
End: 2024-07-30
Payer: MEDICARE

## 2024-07-30 PROCEDURE — RXMED WILLOW AMBULATORY MEDICATION CHARGE

## 2024-08-05 PROCEDURE — RXMED WILLOW AMBULATORY MEDICATION CHARGE

## 2024-08-08 ENCOUNTER — PHARMACY VISIT (OUTPATIENT)
Dept: PHARMACY | Facility: CLINIC | Age: 74
End: 2024-08-08
Payer: MEDICARE

## 2024-08-15 ENCOUNTER — PATIENT MESSAGE (OUTPATIENT)
Dept: PRIMARY CARE | Facility: CLINIC | Age: 74
End: 2024-08-15
Payer: MEDICARE

## 2024-08-15 DIAGNOSIS — M81.0 AGE RELATED OSTEOPOROSIS, UNSPECIFIED PATHOLOGICAL FRACTURE PRESENCE: ICD-10-CM

## 2024-08-15 DIAGNOSIS — Z12.31 ENCOUNTER FOR SCREENING MAMMOGRAM FOR BREAST CANCER: Primary | ICD-10-CM

## 2024-08-15 DIAGNOSIS — M54.16 LUMBAR RADICULOPATHY: ICD-10-CM

## 2024-08-15 RX ORDER — PREGABALIN 75 MG/1
CAPSULE ORAL
Qty: 90 CAPSULE | Refills: 0 | OUTPATIENT
Start: 2024-08-15

## 2024-08-16 ENCOUNTER — PHARMACY VISIT (OUTPATIENT)
Dept: PHARMACY | Facility: CLINIC | Age: 74
End: 2024-08-16
Payer: MEDICARE

## 2024-08-16 PROCEDURE — RXMED WILLOW AMBULATORY MEDICATION CHARGE

## 2024-08-16 RX ORDER — PREGABALIN 75 MG/1
75 CAPSULE ORAL 3 TIMES DAILY
Qty: 90 CAPSULE | Refills: 2 | OUTPATIENT
Start: 2024-08-15

## 2024-08-16 RX ORDER — FAMOTIDINE 40 MG/1
40 TABLET, FILM COATED ORAL NIGHTLY
Qty: 30 TABLET | Refills: 3 | OUTPATIENT
Start: 2024-08-15

## 2024-08-16 RX ORDER — CELECOXIB 200 MG/1
200 CAPSULE ORAL DAILY
Qty: 30 CAPSULE | Refills: 3 | OUTPATIENT
Start: 2024-08-15

## 2024-08-20 ENCOUNTER — APPOINTMENT (OUTPATIENT)
Dept: RADIOLOGY | Facility: HOSPITAL | Age: 74
End: 2024-08-20
Payer: MEDICARE

## 2024-08-20 VITALS — WEIGHT: 172 LBS | BODY MASS INDEX: 29.37 KG/M2 | HEIGHT: 64 IN

## 2024-08-20 DIAGNOSIS — Z12.31 ENCOUNTER FOR SCREENING MAMMOGRAM FOR BREAST CANCER: ICD-10-CM

## 2024-08-20 PROCEDURE — 77067 SCR MAMMO BI INCL CAD: CPT

## 2024-08-20 PROCEDURE — 77067 SCR MAMMO BI INCL CAD: CPT | Performed by: RADIOLOGY

## 2024-08-20 PROCEDURE — 77063 BREAST TOMOSYNTHESIS BI: CPT | Performed by: RADIOLOGY

## 2024-08-23 PROCEDURE — RXMED WILLOW AMBULATORY MEDICATION CHARGE

## 2024-08-26 ENCOUNTER — PHARMACY VISIT (OUTPATIENT)
Dept: PHARMACY | Facility: CLINIC | Age: 74
End: 2024-08-26
Payer: MEDICARE

## 2024-08-26 ENCOUNTER — LAB (OUTPATIENT)
Dept: LAB | Facility: LAB | Age: 74
End: 2024-08-26
Payer: MEDICARE

## 2024-08-26 DIAGNOSIS — Z79.899 OTHER LONG TERM (CURRENT) DRUG THERAPY: Primary | ICD-10-CM

## 2024-08-26 DIAGNOSIS — M06.09 RHEUMATOID ARTHRITIS WITHOUT RHEUMATOID FACTOR, MULTIPLE SITES (MULTI): ICD-10-CM

## 2024-08-26 PROCEDURE — 36415 COLL VENOUS BLD VENIPUNCTURE: CPT

## 2024-08-26 PROCEDURE — 86481 TB AG RESPONSE T-CELL SUSP: CPT

## 2024-08-28 LAB
NIL(NEG) CONTROL SPOT COUNT: NORMAL
PANEL A SPOT COUNT: 1
PANEL B SPOT COUNT: 0
POS CONTROL SPOT COUNT: NORMAL
T-SPOT. TB INTERPRETATION: NEGATIVE

## 2024-08-30 ENCOUNTER — PHARMACY VISIT (OUTPATIENT)
Dept: PHARMACY | Facility: CLINIC | Age: 74
End: 2024-08-30
Payer: MEDICARE

## 2024-08-30 PROCEDURE — RXMED WILLOW AMBULATORY MEDICATION CHARGE

## 2024-08-30 RX ORDER — AZITHROMYCIN 250 MG/1
TABLET, FILM COATED ORAL
Qty: 6 TABLET | Refills: 0 | OUTPATIENT
Start: 2024-08-30 | End: 2024-09-04

## 2024-09-03 DIAGNOSIS — M81.0 AGE-RELATED OSTEOPOROSIS WITHOUT CURRENT PATHOLOGICAL FRACTURE: Primary | ICD-10-CM

## 2024-09-03 RX ORDER — ZOLEDRONIC ACID 5 MG/100ML
5 INJECTION, SOLUTION INTRAVENOUS ONCE
OUTPATIENT
Start: 2024-09-03

## 2024-09-03 RX ORDER — EPINEPHRINE 0.3 MG/.3ML
0.3 INJECTION SUBCUTANEOUS EVERY 5 MIN PRN
OUTPATIENT
Start: 2024-09-03

## 2024-09-03 RX ORDER — DIPHENHYDRAMINE HYDROCHLORIDE 50 MG/ML
50 INJECTION INTRAMUSCULAR; INTRAVENOUS AS NEEDED
OUTPATIENT
Start: 2024-09-03

## 2024-09-03 RX ORDER — FAMOTIDINE 10 MG/ML
20 INJECTION INTRAVENOUS ONCE AS NEEDED
OUTPATIENT
Start: 2024-09-03

## 2024-09-03 RX ORDER — ALBUTEROL SULFATE 0.83 MG/ML
3 SOLUTION RESPIRATORY (INHALATION) AS NEEDED
OUTPATIENT
Start: 2024-09-03

## 2024-09-03 NOTE — ADDENDUM NOTE
Addended by: KELLY MENDEZ on: 9/3/2024 09:57 AM     Modules accepted: Orders    
[FreeTextEntry1] : referred for evaluation of hydronephrosis.\par From records he brought had ULS for nondescript abdominal pain. \par ULS noted mild-moderate right hydronephrosis. GFR WNL and UA no blood or inflammation.\par no h/o flank or abdominal paln. No h/o stones, UTIs or hematuria. \par \par he does not some LUTS noting frequency every 2 hours dy and nocturia 3-4 times with some urgency but no leakage. The FOS is slower but no straining or intermittency. he has been on alfuzosin, Rapaflo and doxazosin - later he felt made it worse.\par PSA good. and PVR 100cc. .

## 2024-09-04 ENCOUNTER — LAB (OUTPATIENT)
Dept: LAB | Facility: LAB | Age: 74
End: 2024-09-04
Payer: MEDICARE

## 2024-09-04 DIAGNOSIS — M81.0 AGE-RELATED OSTEOPOROSIS WITHOUT CURRENT PATHOLOGICAL FRACTURE: ICD-10-CM

## 2024-09-04 LAB
ALBUMIN SERPL BCP-MCNC: 4.1 G/DL (ref 3.4–5)
ALP SERPL-CCNC: 55 U/L (ref 33–136)
ALT SERPL W P-5'-P-CCNC: 18 U/L (ref 7–45)
ANION GAP SERPL CALC-SCNC: 12 MMOL/L (ref 10–20)
AST SERPL W P-5'-P-CCNC: 40 U/L (ref 9–39)
BILIRUB SERPL-MCNC: 0.6 MG/DL (ref 0–1.2)
BUN SERPL-MCNC: 19 MG/DL (ref 6–23)
CALCIUM SERPL-MCNC: 8.9 MG/DL (ref 8.6–10.3)
CHLORIDE SERPL-SCNC: 99 MMOL/L (ref 98–107)
CO2 SERPL-SCNC: 27 MMOL/L (ref 21–32)
CREAT SERPL-MCNC: 0.68 MG/DL (ref 0.5–1.05)
EGFRCR SERPLBLD CKD-EPI 2021: >90 ML/MIN/1.73M*2
GLUCOSE SERPL-MCNC: 80 MG/DL (ref 74–99)
POTASSIUM SERPL-SCNC: 4.7 MMOL/L (ref 3.5–5.3)
PROT SERPL-MCNC: 6.4 G/DL (ref 6.4–8.2)
SODIUM SERPL-SCNC: 133 MMOL/L (ref 136–145)

## 2024-09-04 PROCEDURE — 36415 COLL VENOUS BLD VENIPUNCTURE: CPT

## 2024-09-04 PROCEDURE — 80053 COMPREHEN METABOLIC PANEL: CPT

## 2024-09-05 DIAGNOSIS — R74.8 ELEVATED LIVER ENZYMES: Primary | ICD-10-CM

## 2024-09-09 ENCOUNTER — PHARMACY VISIT (OUTPATIENT)
Dept: PHARMACY | Facility: CLINIC | Age: 74
End: 2024-09-09
Payer: COMMERCIAL

## 2024-09-09 PROCEDURE — RXMED WILLOW AMBULATORY MEDICATION CHARGE

## 2024-09-10 PROCEDURE — RXMED WILLOW AMBULATORY MEDICATION CHARGE

## 2024-09-12 ENCOUNTER — PHARMACY VISIT (OUTPATIENT)
Dept: PHARMACY | Facility: CLINIC | Age: 74
End: 2024-09-12
Payer: MEDICARE

## 2024-09-12 PROCEDURE — RXMED WILLOW AMBULATORY MEDICATION CHARGE

## 2024-09-13 ENCOUNTER — LAB (OUTPATIENT)
Dept: LAB | Facility: LAB | Age: 74
End: 2024-09-13
Payer: MEDICARE

## 2024-09-13 DIAGNOSIS — M81.0 AGE RELATED OSTEOPOROSIS, UNSPECIFIED PATHOLOGICAL FRACTURE PRESENCE: ICD-10-CM

## 2024-09-13 LAB
ALBUMIN SERPL BCP-MCNC: 4 G/DL (ref 3.4–5)
ALP SERPL-CCNC: 56 U/L (ref 33–136)
ALT SERPL W P-5'-P-CCNC: 14 U/L (ref 7–45)
ANION GAP SERPL CALC-SCNC: 10 MMOL/L (ref 10–20)
AST SERPL W P-5'-P-CCNC: 18 U/L (ref 9–39)
BILIRUB SERPL-MCNC: 0.5 MG/DL (ref 0–1.2)
BUN SERPL-MCNC: 20 MG/DL (ref 6–23)
CALCIUM SERPL-MCNC: 9.3 MG/DL (ref 8.6–10.3)
CHLORIDE SERPL-SCNC: 103 MMOL/L (ref 98–107)
CO2 SERPL-SCNC: 29 MMOL/L (ref 21–32)
CREAT SERPL-MCNC: 0.6 MG/DL (ref 0.5–1.05)
EGFRCR SERPLBLD CKD-EPI 2021: >90 ML/MIN/1.73M*2
GLUCOSE SERPL-MCNC: 76 MG/DL (ref 74–99)
POTASSIUM SERPL-SCNC: 4.6 MMOL/L (ref 3.5–5.3)
PROT SERPL-MCNC: 6.1 G/DL (ref 6.4–8.2)
SODIUM SERPL-SCNC: 137 MMOL/L (ref 136–145)

## 2024-09-13 PROCEDURE — 36415 COLL VENOUS BLD VENIPUNCTURE: CPT

## 2024-09-13 PROCEDURE — 80053 COMPREHEN METABOLIC PANEL: CPT

## 2024-09-16 ENCOUNTER — PHARMACY VISIT (OUTPATIENT)
Dept: PHARMACY | Facility: CLINIC | Age: 74
End: 2024-09-16
Payer: MEDICARE

## 2024-09-16 ENCOUNTER — OFFICE VISIT (OUTPATIENT)
Dept: PRIMARY CARE | Facility: CLINIC | Age: 74
End: 2024-09-16
Payer: MEDICARE

## 2024-09-16 VITALS
WEIGHT: 182 LBS | HEIGHT: 64 IN | TEMPERATURE: 97.6 F | DIASTOLIC BLOOD PRESSURE: 76 MMHG | HEART RATE: 72 BPM | BODY MASS INDEX: 31.07 KG/M2 | SYSTOLIC BLOOD PRESSURE: 130 MMHG | OXYGEN SATURATION: 98 % | RESPIRATION RATE: 14 BRPM

## 2024-09-16 DIAGNOSIS — J01.00 ACUTE NON-RECURRENT MAXILLARY SINUSITIS: ICD-10-CM

## 2024-09-16 DIAGNOSIS — Z00.00 ROUTINE GENERAL MEDICAL EXAMINATION AT A HEALTH CARE FACILITY: Primary | ICD-10-CM

## 2024-09-16 DIAGNOSIS — J20.9 ACUTE BRONCHITIS, UNSPECIFIED ORGANISM: ICD-10-CM

## 2024-09-16 PROCEDURE — 99213 OFFICE O/P EST LOW 20 MIN: CPT | Performed by: FAMILY MEDICINE

## 2024-09-16 PROCEDURE — 3008F BODY MASS INDEX DOCD: CPT | Performed by: FAMILY MEDICINE

## 2024-09-16 PROCEDURE — 1159F MED LIST DOCD IN RCRD: CPT | Performed by: FAMILY MEDICINE

## 2024-09-16 PROCEDURE — 1158F ADVNC CARE PLAN TLK DOCD: CPT | Performed by: FAMILY MEDICINE

## 2024-09-16 PROCEDURE — RXMED WILLOW AMBULATORY MEDICATION CHARGE

## 2024-09-16 PROCEDURE — 1170F FXNL STATUS ASSESSED: CPT | Performed by: FAMILY MEDICINE

## 2024-09-16 PROCEDURE — G0439 PPPS, SUBSEQ VISIT: HCPCS | Performed by: FAMILY MEDICINE

## 2024-09-16 PROCEDURE — 1160F RVW MEDS BY RX/DR IN RCRD: CPT | Performed by: FAMILY MEDICINE

## 2024-09-16 PROCEDURE — 1123F ACP DISCUSS/DSCN MKR DOCD: CPT | Performed by: FAMILY MEDICINE

## 2024-09-16 PROCEDURE — 1036F TOBACCO NON-USER: CPT | Performed by: FAMILY MEDICINE

## 2024-09-16 RX ORDER — AMOXICILLIN AND CLAVULANATE POTASSIUM 875; 125 MG/1; MG/1
875 TABLET, FILM COATED ORAL 2 TIMES DAILY
Qty: 20 TABLET | Refills: 0 | Status: SHIPPED | OUTPATIENT
Start: 2024-09-16 | End: 2024-09-26

## 2024-09-16 RX ORDER — BROMPHENIRAMINE MALEATE, PSEUDOEPHEDRINE HYDROCHLORIDE, AND DEXTROMETHORPHAN HYDROBROMIDE 2; 30; 10 MG/5ML; MG/5ML; MG/5ML
10 SYRUP ORAL EVERY 6 HOURS PRN
Qty: 200 ML | Refills: 0 | Status: SHIPPED | OUTPATIENT
Start: 2024-09-16

## 2024-09-16 ASSESSMENT — ACTIVITIES OF DAILY LIVING (ADL)
GROCERY_SHOPPING: INDEPENDENT
TAKING_MEDICATION: INDEPENDENT
DRESSING: INDEPENDENT
DOING_HOUSEWORK: INDEPENDENT
BATHING: INDEPENDENT
MANAGING_FINANCES: INDEPENDENT

## 2024-09-16 ASSESSMENT — ENCOUNTER SYMPTOMS
NUMBNESS: 0
OCCASIONAL FEELINGS OF UNSTEADINESS: 1
HEMATURIA: 0
SORE THROAT: 0
DIZZINESS: 0
ABDOMINAL PAIN: 0
CONSTIPATION: 0
CHILLS: 0
TREMORS: 0
LOSS OF SENSATION IN FEET: 1
WHEEZING: 0
HEADACHES: 1
DEPRESSION: 0
COUGH: 1
DYSURIA: 0
PALPITATIONS: 0
SINUS PRESSURE: 1
VOMITING: 0
FREQUENCY: 0
RHINORRHEA: 1
DIARRHEA: 0
SHORTNESS OF BREATH: 0
FEVER: 0

## 2024-09-16 NOTE — ASSESSMENT & PLAN NOTE
We will start her on Augmentin. Recommend liberal oral fluid intake.  Call if symptoms fail to improve as expected.    Follow-up if persistent or worsening symptoms otherwise when necessary.

## 2024-09-16 NOTE — PROGRESS NOTES
"Subjective   Patient ID: Rachel Mann is a 73 y.o. female who presents for Medicare Annual Wellness Visit Subsequent and URI.    She presents for Annual Medicare Wellness Visit.     URI   This is a new problem. The current episode started 1 to 4 weeks ago. The problem has been unchanged. There has been no fever. Associated symptoms include congestion, coughing, headaches and rhinorrhea. Pertinent negatives include no abdominal pain, chest pain, diarrhea, dysuria, ear pain, sore throat, vomiting or wheezing.     Past Medical, Surgical, and Family History reviewed and updated in chart.    Reviewed all medications by prescribing practitioner or clinical pharmacist (such as prescriptions, OTCs, herbal therapies and supplements) and documented in the medical record.    Patient Self Assessment of Health Status  Patient Self Assessment: Fair    Nutrition and Exercise  Current Diet: Heart Healthy Diet    Functional Ability/Level of Safety  Cognitive Impairment Observed: No cognitive impairment observed  Cognitive Impairment Reported: No cognitive impairment reported by patient or family    Home Safety Risk Factors: None     Review of Systems   Constitutional:  Negative for chills and fever.   HENT:  Positive for congestion, rhinorrhea and sinus pressure. Negative for ear pain, nosebleeds and sore throat.    Respiratory:  Positive for cough. Negative for shortness of breath and wheezing.    Cardiovascular:  Negative for chest pain, palpitations and leg swelling.   Gastrointestinal:  Negative for abdominal pain, constipation, diarrhea and vomiting.   Genitourinary:  Negative for dysuria, frequency and hematuria.   Neurological:  Positive for headaches. Negative for dizziness, tremors and numbness.       Objective   /76 (BP Location: Left arm, Patient Position: Sitting, BP Cuff Size: Adult long)   Pulse 72   Temp 36.4 °C (97.6 °F)   Resp 14   Ht 1.626 m (5' 4\")   Wt 82.6 kg (182 lb)   SpO2 98%   BMI 31.24 kg/m² "     Physical Exam  Constitutional:       General: She is not in acute distress.     Appearance: Normal appearance.   HENT:      Head: Normocephalic and atraumatic.      Mouth/Throat:      Mouth: Mucous membranes are moist.      Pharynx: Oropharynx is clear. No oropharyngeal exudate or posterior oropharyngeal erythema.   Eyes:      General: No scleral icterus.     Extraocular Movements: Extraocular movements intact.      Pupils: Pupils are equal, round, and reactive to light.   Cardiovascular:      Rate and Rhythm: Normal rate and regular rhythm.      Pulses: Normal pulses.      Heart sounds: No murmur heard.     No friction rub. No gallop.   Pulmonary:      Effort: Pulmonary effort is normal.      Breath sounds: No wheezing, rhonchi or rales.   Skin:     General: Skin is warm.      Coloration: Skin is not jaundiced or pale.      Findings: No erythema or rash.   Neurological:      General: No focal deficit present.      Mental Status: She is alert and oriented to person, place, and time.      Cranial Nerves: No cranial nerve deficit.      Sensory: No sensory deficit.      Coordination: Coordination normal.      Gait: Gait normal.         Assessment/Plan   Problem List Items Addressed This Visit       Acute bronchitis, unspecified     We will start her on Augmentin. Recommend liberal oral fluid intake.  Call if symptoms fail to improve as expected.    Follow-up if persistent or worsening symptoms otherwise when necessary.         Relevant Medications    amoxicillin-pot clavulanate (Augmentin) 875-125 mg tablet    brompheniramine-pseudoeph-DM 2-30-10 mg/5 mL syrup    Acute non-recurrent maxillary sinusitis     We will start her on Augmentin. Recommend liberal oral fluid intake.  Call if symptoms fail to improve as expected.    Follow-up if persistent or worsening symptoms otherwise when necessary.         Relevant Medications    amoxicillin-pot clavulanate (Augmentin) 875-125 mg tablet    brompheniramine-pseudoeph-DM  2-30-10 mg/5 mL syrup    Routine general medical examination at a health care facility - Primary     Recommend low-cholesterol diet, low-fat diet and low-salt diet.  The need for lifelong dietary compliance in order to reduce cardiac risk is recommended.  We will also recommend regular exercise program to improve lipid balance and overall health.  Recommend decreasing fat and cholesterol in diet, increasing aerobic exercise with a goal of 4 or more days per week          Scribe Attestation  By signing my name below, I, David Petty, Yulyibsara   attest that this documentation has been prepared under the direction and in the presence of Mat Tatum MD.    Patient was identified as a fall risk. Risk prevention instructions provided.

## 2024-09-17 ENCOUNTER — APPOINTMENT (OUTPATIENT)
Dept: HEMATOLOGY/ONCOLOGY | Facility: CLINIC | Age: 74
End: 2024-09-17
Payer: MEDICARE

## 2024-09-22 PROCEDURE — RXMED WILLOW AMBULATORY MEDICATION CHARGE

## 2024-09-24 ENCOUNTER — PHARMACY VISIT (OUTPATIENT)
Dept: PHARMACY | Facility: CLINIC | Age: 74
End: 2024-09-24
Payer: MEDICARE

## 2024-10-07 ENCOUNTER — INFUSION (OUTPATIENT)
Dept: HEMATOLOGY/ONCOLOGY | Facility: CLINIC | Age: 74
End: 2024-10-07
Payer: MEDICARE

## 2024-10-07 VITALS
HEIGHT: 65 IN | BODY MASS INDEX: 28.66 KG/M2 | WEIGHT: 172 LBS | HEART RATE: 70 BPM | TEMPERATURE: 97.9 F | DIASTOLIC BLOOD PRESSURE: 75 MMHG | RESPIRATION RATE: 20 BRPM | SYSTOLIC BLOOD PRESSURE: 131 MMHG | OXYGEN SATURATION: 96 %

## 2024-10-07 DIAGNOSIS — M81.0 AGE-RELATED OSTEOPOROSIS WITHOUT CURRENT PATHOLOGICAL FRACTURE: ICD-10-CM

## 2024-10-07 PROCEDURE — 96365 THER/PROPH/DIAG IV INF INIT: CPT | Mod: INF

## 2024-10-07 PROCEDURE — 2500000004 HC RX 250 GENERAL PHARMACY W/ HCPCS (ALT 636 FOR OP/ED): Performed by: FAMILY MEDICINE

## 2024-10-07 RX ORDER — ALBUTEROL SULFATE 0.83 MG/ML
3 SOLUTION RESPIRATORY (INHALATION) AS NEEDED
OUTPATIENT
Start: 2024-10-07

## 2024-10-07 RX ORDER — DIPHENHYDRAMINE HYDROCHLORIDE 50 MG/ML
50 INJECTION INTRAMUSCULAR; INTRAVENOUS AS NEEDED
OUTPATIENT
Start: 2024-10-07

## 2024-10-07 RX ORDER — FAMOTIDINE 10 MG/ML
20 INJECTION INTRAVENOUS ONCE AS NEEDED
OUTPATIENT
Start: 2024-10-07

## 2024-10-07 RX ORDER — EPINEPHRINE 0.3 MG/.3ML
0.3 INJECTION SUBCUTANEOUS EVERY 5 MIN PRN
OUTPATIENT
Start: 2024-10-07

## 2024-10-07 RX ORDER — ZOLEDRONIC ACID 5 MG/100ML
5 INJECTION, SOLUTION INTRAVENOUS ONCE
Status: COMPLETED | OUTPATIENT
Start: 2024-10-07 | End: 2024-10-07

## 2024-10-07 RX ORDER — ZOLEDRONIC ACID 5 MG/100ML
5 INJECTION, SOLUTION INTRAVENOUS ONCE
Status: CANCELLED | OUTPATIENT
Start: 2024-10-07

## 2024-10-07 ASSESSMENT — PAIN SCALES - GENERAL: PAINLEVEL: 7

## 2024-10-07 NOTE — PROGRESS NOTES
Reclast Lexicomp patient information given and reviewed with patient.  Patient tolerated Reclast infusion without sign of adverse reaction.  Patient observed 20 minutes post infusion.  Patient encouraged to drink water today and to continue taking calcium with vitamin D.  Discharged in stable condition.

## 2024-10-08 PROCEDURE — RXMED WILLOW AMBULATORY MEDICATION CHARGE

## 2024-10-09 ENCOUNTER — PHARMACY VISIT (OUTPATIENT)
Dept: PHARMACY | Facility: CLINIC | Age: 74
End: 2024-10-09
Payer: MEDICARE

## 2024-10-14 PROCEDURE — RXMED WILLOW AMBULATORY MEDICATION CHARGE

## 2024-10-16 ENCOUNTER — PHARMACY VISIT (OUTPATIENT)
Dept: PHARMACY | Facility: CLINIC | Age: 74
End: 2024-10-16
Payer: MEDICARE

## 2024-10-21 PROCEDURE — RXMED WILLOW AMBULATORY MEDICATION CHARGE

## 2024-10-22 ENCOUNTER — PHARMACY VISIT (OUTPATIENT)
Dept: PHARMACY | Facility: CLINIC | Age: 74
End: 2024-10-22
Payer: MEDICARE

## 2024-11-01 PROCEDURE — RXMED WILLOW AMBULATORY MEDICATION CHARGE

## 2024-11-06 ENCOUNTER — PHARMACY VISIT (OUTPATIENT)
Dept: PHARMACY | Facility: CLINIC | Age: 74
End: 2024-11-06
Payer: MEDICARE

## 2024-11-11 PROCEDURE — RXMED WILLOW AMBULATORY MEDICATION CHARGE

## 2024-11-12 ENCOUNTER — PHARMACY VISIT (OUTPATIENT)
Dept: PHARMACY | Facility: CLINIC | Age: 74
End: 2024-11-12
Payer: MEDICARE

## 2024-11-14 PROCEDURE — RXMED WILLOW AMBULATORY MEDICATION CHARGE

## 2024-11-16 ENCOUNTER — PHARMACY VISIT (OUTPATIENT)
Dept: PHARMACY | Facility: CLINIC | Age: 74
End: 2024-11-16
Payer: MEDICARE

## 2024-12-16 PROCEDURE — RXMED WILLOW AMBULATORY MEDICATION CHARGE

## 2024-12-19 ENCOUNTER — PHARMACY VISIT (OUTPATIENT)
Dept: PHARMACY | Facility: CLINIC | Age: 74
End: 2024-12-19
Payer: MEDICARE

## 2024-12-19 PROCEDURE — RXMED WILLOW AMBULATORY MEDICATION CHARGE

## 2024-12-19 RX ORDER — CELECOXIB 200 MG/1
200 CAPSULE ORAL DAILY
Qty: 30 CAPSULE | Refills: 3 | OUTPATIENT
Start: 2024-12-19

## 2024-12-19 RX ORDER — FAMOTIDINE 40 MG/1
40 TABLET, FILM COATED ORAL DAILY
Qty: 30 TABLET | Refills: 3 | OUTPATIENT
Start: 2024-12-19

## 2024-12-20 ENCOUNTER — PHARMACY VISIT (OUTPATIENT)
Dept: PHARMACY | Facility: CLINIC | Age: 74
End: 2024-12-20
Payer: MEDICARE

## 2025-01-03 ENCOUNTER — TELEPHONE (OUTPATIENT)
Dept: PRIMARY CARE | Facility: CLINIC | Age: 75
End: 2025-01-03

## 2025-01-03 NOTE — TELEPHONE ENCOUNTER
LISA RODRIGUES CALLED IN TO SEE IF AP WOULD FOLLOW SHIRA FOR SKILLED NURSING, PT AND OT AFTER SHE IS DISCHARGED FROM [facility] THIS WEEK. PLEASE ADVISE?     CALLBACK# [phone]

## 2025-01-07 ENCOUNTER — PHARMACY VISIT (OUTPATIENT)
Dept: PHARMACY | Facility: CLINIC | Age: 75
End: 2025-01-07
Payer: COMMERCIAL

## 2025-01-07 PROCEDURE — RXMED WILLOW AMBULATORY MEDICATION CHARGE

## 2025-01-07 RX ORDER — METHOCARBAMOL 750 MG/1
750 TABLET, FILM COATED ORAL 3 TIMES DAILY
Qty: 180 TABLET | Refills: 0 | OUTPATIENT
Start: 2025-01-07

## 2025-01-08 ENCOUNTER — PATIENT OUTREACH (OUTPATIENT)
Dept: PRIMARY CARE | Facility: CLINIC | Age: 75
End: 2025-01-08
Payer: MEDICARE

## 2025-01-08 PROCEDURE — RXMED WILLOW AMBULATORY MEDICATION CHARGE

## 2025-01-08 NOTE — PROGRESS NOTES
Discharge Facility: [facility]   Discharge Diagnosis: Axonal sensorimotor neuropathy   Lumbosacral spondylosis without myelopathy  Admission Date: 12/27/24  Discharge Date: 1/7/25    PCP Appointment Date:  Mat Tatum MD tasked to office                                                 Specialist Appointment Date:  Neuro Surg  D'Amico PA-C 1/9/25  Neuro Dr Vargas 1/20/25  Hospital Encounter and Summary Linked: Yes  See discharge assessment below for further details     Engagement  Call Start Time: 1120 (1/8/2025  1:09 PM)    Medications  Medications reviewed with patient/caregiver?: Yes (new/changes) (1/8/2025  1:09 PM)  Is the patient having any side effects they believe may be caused by any medication additions or changes?: No (1/8/2025  1:09 PM)  Does the patient have all medications ordered at discharge?: Yes (1/8/2025  1:09 PM)  Care Management Interventions: No intervention needed (1/8/2025  1:09 PM)  Prescription Comments: senna-docusate (SENOKOT-S) 8.6-50 MG    sulfaSALAzine (AZULFIDINE) 500 MG tablet    pregabalin (LYRICA) 100 MG capsule    Oyster Shell Calcium 500 MG tablet tablet    ondansetron ODT (ZOFRAN-ODT) 4 MG disintegrating tablet    multivitamin w/ minerals (THERA M PLUS) Tab/Cap tablet    methotrexate (TREXALL) 15 MG tablet    methocarbamol (ROBAXIN) 750 MG tablet    famotidine (PEPCID) 20 MG tablet    docusate sodium (DSS) 100 MG capsule    acetaminophen (TYLENOL) 500 MG tablet (1/8/2025  1:09 PM)  Is the patient taking all medications as directed (includes completed medication regime)?: Yes (1/8/2025  1:09 PM)  Care Management Interventions: Provided patient education (1/8/2025  1:09 PM)  Medication Comments: no noted issues obtaining medications (1/8/2025  1:09 PM)    Appointments  Does the patient have a primary care provider?: Yes (1/8/2025  1:09 PM)  Care Management Interventions: Educated patient on importance of making appointment; Advised patient to make  appointment (1/8/2025  1:09 PM)  Has the patient kept scheduled appointments due by today?: Yes (1/8/2025  1:09 PM)  Care Management Interventions: Advised patient to keep appointment (1/8/2025  1:09 PM)    Self Management  What is the home health agency?: [facility] (1/8/2025  1:09 PM)  What Durable Medical Equipment (DME) was ordered?: rolling walker (1/8/2025  1:09 PM)    Patient Teaching  Does the patient have access to their discharge instructions?: Yes (1/8/2025  1:09 PM)  Care Management Interventions: Reviewed instructions with patient (1/8/2025  1:09 PM)  What is the patient's perception of their health status since discharge?: Improving (1/8/2025  1:09 PM)  Is the patient/caregiver able to teach back the hierarchy of who to call/visit for symptoms/problems? PCP, Specialist, Home Health nurse, Urgent Care, ED, 911: Yes (1/8/2025  1:09 PM)  Patient/Caregiver Education Comments: CM discussed referencing discharge paperwork to follow detailed daily medication schedule (1/8/2025  1:09 PM)    Wrap Up  Wrap Up Additional Comments: This CM spoke with patient via phone. Successful transition of care outreach complete. Pt reports doing well at home since discharge. New meds reviewed. Pt denies any prescription medication questions. Pt was able to go up and down the stairs a couple times. The wound is healing well with no signs of infection. Patient denies any further discharge questions/needs at this time. Emphasized that Follow up is needed after discharge to review the hospital recommendations, assess your response to your treatment.  Pt aware of my availability for non-emergent concerns. Contact info provided to patient. (1/8/2025  1:09 PM)

## 2025-01-10 ENCOUNTER — PHARMACY VISIT (OUTPATIENT)
Dept: PHARMACY | Facility: CLINIC | Age: 75
End: 2025-01-10
Payer: MEDICARE

## 2025-01-13 ENCOUNTER — APPOINTMENT (OUTPATIENT)
Dept: PRIMARY CARE | Facility: CLINIC | Age: 75
End: 2025-01-13
Payer: MEDICARE

## 2025-01-13 VITALS
HEART RATE: 118 BPM | DIASTOLIC BLOOD PRESSURE: 68 MMHG | HEIGHT: 65 IN | BODY MASS INDEX: 28.32 KG/M2 | OXYGEN SATURATION: 97 % | RESPIRATION RATE: 17 BRPM | SYSTOLIC BLOOD PRESSURE: 110 MMHG | WEIGHT: 170 LBS | TEMPERATURE: 97.5 F

## 2025-01-13 DIAGNOSIS — M35.3 POLYMYALGIA (MULTI): ICD-10-CM

## 2025-01-13 DIAGNOSIS — Z09 HOSPITAL DISCHARGE FOLLOW-UP: Primary | ICD-10-CM

## 2025-01-13 DIAGNOSIS — D62 POSTOPERATIVE ANEMIA DUE TO ACUTE BLOOD LOSS: ICD-10-CM

## 2025-01-13 DIAGNOSIS — G83.10 PARESIS OF SINGLE LOWER EXTREMITY (MULTI): ICD-10-CM

## 2025-01-13 DIAGNOSIS — M06.9 RHEUMATOID ARTHRITIS, INVOLVING UNSPECIFIED SITE, UNSPECIFIED WHETHER RHEUMATOID FACTOR PRESENT (MULTI): ICD-10-CM

## 2025-01-13 DIAGNOSIS — G62.89 AXONAL SENSORIMOTOR NEUROPATHY: ICD-10-CM

## 2025-01-13 DIAGNOSIS — M48.062 SPINAL STENOSIS OF LUMBAR REGION WITH NEUROGENIC CLAUDICATION: ICD-10-CM

## 2025-01-13 DIAGNOSIS — M21.372 LEFT FOOT DROP: ICD-10-CM

## 2025-01-13 DIAGNOSIS — M47.817 LUMBOSACRAL SPONDYLOSIS WITHOUT MYELOPATHY: ICD-10-CM

## 2025-01-13 DIAGNOSIS — L98.9 SKIN LESION OF FACE: ICD-10-CM

## 2025-01-13 PROCEDURE — 1160F RVW MEDS BY RX/DR IN RCRD: CPT | Performed by: FAMILY MEDICINE

## 2025-01-13 PROCEDURE — 99495 TRANSJ CARE MGMT MOD F2F 14D: CPT | Performed by: FAMILY MEDICINE

## 2025-01-13 PROCEDURE — 1159F MED LIST DOCD IN RCRD: CPT | Performed by: FAMILY MEDICINE

## 2025-01-13 PROCEDURE — 1036F TOBACCO NON-USER: CPT | Performed by: FAMILY MEDICINE

## 2025-01-13 PROCEDURE — 3008F BODY MASS INDEX DOCD: CPT | Performed by: FAMILY MEDICINE

## 2025-01-13 PROCEDURE — 1123F ACP DISCUSS/DSCN MKR DOCD: CPT | Performed by: FAMILY MEDICINE

## 2025-01-13 ASSESSMENT — ENCOUNTER SYMPTOMS
FEVER: 0
DYSURIA: 0
SHORTNESS OF BREATH: 0
DIZZINESS: 0
SORE THROAT: 0
CONSTIPATION: 0
ABDOMINAL PAIN: 0
BACK PAIN: 1
PALPITATIONS: 0
WHEEZING: 0
COUGH: 0
RHINORRHEA: 0
BOWEL INCONTINENCE: 0
CHILLS: 0
WEAKNESS: 0
TREMORS: 0
DIARRHEA: 0
VOMITING: 0
NUMBNESS: 0
HEMATURIA: 0
HEADACHES: 0
LEG PAIN: 1
FREQUENCY: 0

## 2025-01-13 NOTE — PROGRESS NOTES
"Subjective   Patient ID: Rachel Mann is a 74 y.o. female who presents for Hospital Follow-up ([facility] 12/23-12/27 then [facility] 12/27- 1/7/2025- Lumbosacral Spondylosis and Spinal Stenosis) and Suspicious Skin Lesion.    She presents today for follow-up after being discharged from the hospital 6 days ago. The main problem requiring admission was Spinal Stenosis of Lumbar Region with Neurogenic Claudication. The discharge summary and/or Transitional Care Management documentation was reviewed. Medication reconciliation was performed as indicated via the \"Tae as Reviewed\" timestamp.     Rachel Mann was contacted by Transitional Care Management services two days after her discharge. This encounter and supporting documentation was reviewed.    The complexity of medical decision making for this patient's transitional care is moderate.    She complains of a lesion on the right side of her face. She first noticed it about 4 months ago. She states it has been unchanged since this time.    She presents today to follow up on Spinal Stenosis of Lumbar Region with Neurogenic Claudication for which she had L2, L3, L4 decompressive laminectomy, decompression of L2-3, L3-4, L4-5 lumbar canal stenosis with bilateral root foraminotomy, bilateral L2-L3, L4, L5 pedicle screw insertion, bilateral L3-4, L4-5 medial facetectomy, and L3-4, L4-5 right-sided transforaminal lumbar interbody with peek cage done on 12/23/2024.    Back Pain  The current episode started more than 1 year ago. The problem has been rapidly improving since onset. The pain is present in the lumbar spine. The pain is mild. Stiffness is present All day. Associated symptoms include leg pain. Pertinent negatives include no abdominal pain, bladder incontinence, bowel incontinence, chest pain, dysuria, fever, headaches, numbness or weakness.      Review of Systems   Constitutional:  Negative for chills and fever.   HENT:  Negative for congestion, ear pain, " "nosebleeds, rhinorrhea and sore throat.    Respiratory:  Negative for cough, shortness of breath and wheezing.    Cardiovascular:  Negative for chest pain, palpitations and leg swelling.   Gastrointestinal:  Negative for abdominal pain, bowel incontinence, constipation, diarrhea and vomiting.   Genitourinary:  Negative for bladder incontinence, dysuria, frequency and hematuria.   Musculoskeletal:  Positive for back pain and gait problem.   Neurological:  Negative for dizziness, tremors, weakness, numbness and headaches.       Objective   /68   Pulse (!) 118   Temp 36.4 °C (97.5 °F)   Resp 17   Ht 1.651 m (5' 5\")   Wt 77.1 kg (170 lb)   SpO2 97%   BMI 28.29 kg/m²     Physical Exam  Constitutional:       General: She is not in acute distress.     Appearance: Normal appearance.   HENT:      Head: Normocephalic and atraumatic.      Mouth/Throat:      Mouth: Mucous membranes are moist.      Pharynx: Oropharynx is clear. No oropharyngeal exudate or posterior oropharyngeal erythema.   Eyes:      General: No scleral icterus.     Extraocular Movements: Extraocular movements intact.      Pupils: Pupils are equal, round, and reactive to light.   Cardiovascular:      Rate and Rhythm: Normal rate and regular rhythm.      Pulses: Normal pulses.      Heart sounds: No murmur heard.     No friction rub. No gallop.   Pulmonary:      Effort: Pulmonary effort is normal.      Breath sounds: No wheezing, rhonchi or rales.   Musculoskeletal:      Cervical back: Normal range of motion and neck supple.   Skin:     General: Skin is warm.      Coloration: Skin is not jaundiced or pale.      Findings: No erythema or rash.      Comments: There is an 8 mm brownish cobalt irregular lesion on the right side of the face which is asymmetrical with rough surface and appears benign compatible with seborrheic keratosis.   Neurological:      General: No focal deficit present.      Mental Status: She is alert and oriented to person, place, " and time.      Cranial Nerves: No cranial nerve deficit.      Sensory: No sensory deficit.      Coordination: Coordination normal.      Gait: Gait normal.         Assessment/Plan   Problem List Items Addressed This Visit       Foot-drop     We anticipate that this will improve with PT following the surgery. Continue PT.         Polymyalgia (Multi)     Chronic Condition Documentation : Stable based on symptoms and exam.  Continue established treatment plan and follow-up at least yearly.         Lumbosacral spondylosis without myelopathy     Continue Home PT/ OT         Axonal sensorimotor neuropathy     Continue current medications and management.         Spinal stenosis of lumbar region with neurogenic claudication     Continue Home PT/ OT and follow up with the Spine Specialist as scheduled.         Skin lesion of face     This appears benign but we will have her continue to monitor it and contact us with any changes.          Postoperative anemia due to acute blood loss     Already has orders to have CBC drawn by Home care later this week to check her blood count.         Rheumatoid arthritis     Chronic Condition Documentation : Stable based on symptoms and exam.  Continue established treatment plan and follow-up at least yearly.         Paresis of single lower extremity (Multi)     Chronic Condition Documentation : Stable based on symptoms and exam.  Continue established treatment plan and follow-up at least yearly.          Other Visit Diagnoses       Hospital discharge follow-up    -  Primary          Scribe Attestation  By signing my name below, IDavid Scribe   attest that this documentation has been prepared under the direction and in the presence of Mat Tatum MD.

## 2025-01-22 ENCOUNTER — PATIENT OUTREACH (OUTPATIENT)
Dept: PRIMARY CARE | Facility: CLINIC | Age: 75
End: 2025-01-22
Payer: MEDICARE

## 2025-01-29 ENCOUNTER — PATIENT OUTREACH (OUTPATIENT)
Dept: PRIMARY CARE | Facility: CLINIC | Age: 75
End: 2025-01-29
Payer: MEDICARE

## 2025-02-03 ENCOUNTER — PHARMACY VISIT (OUTPATIENT)
Dept: PHARMACY | Facility: CLINIC | Age: 75
End: 2025-02-03
Payer: MEDICARE

## 2025-02-03 PROCEDURE — RXMED WILLOW AMBULATORY MEDICATION CHARGE

## 2025-02-07 ENCOUNTER — PHARMACY VISIT (OUTPATIENT)
Dept: PHARMACY | Facility: CLINIC | Age: 75
End: 2025-02-07

## 2025-02-17 ENCOUNTER — PATIENT MESSAGE (OUTPATIENT)
Dept: PRIMARY CARE | Facility: CLINIC | Age: 75
End: 2025-02-17
Payer: MEDICARE

## 2025-02-17 ENCOUNTER — DOCUMENTATION (OUTPATIENT)
Dept: PRIMARY CARE | Facility: CLINIC | Age: 75
End: 2025-02-17
Payer: MEDICARE

## 2025-02-17 DIAGNOSIS — D64.9 ANEMIA, UNSPECIFIED TYPE: ICD-10-CM

## 2025-02-17 NOTE — PROGRESS NOTES
Pt emailed this CM at one month communication follow up concerned that she has been on iron supplements for two weeks and continues to feel fatigue. Suggested the patient reach out to Dr Tatum for further answers. Pt states she will reach out to PCP.

## 2025-03-03 DIAGNOSIS — D64.9 ANEMIA, UNSPECIFIED TYPE: ICD-10-CM

## 2025-03-03 LAB
ALBUMIN SERPL-MCNC: 4.5 G/DL (ref 3.6–5.1)
ALP SERPL-CCNC: 67 U/L (ref 37–153)
ALT SERPL-CCNC: 12 U/L (ref 6–29)
ANION GAP SERPL CALCULATED.4IONS-SCNC: 7 MMOL/L (CALC) (ref 7–17)
AST SERPL-CCNC: 20 U/L (ref 10–35)
BILIRUB SERPL-MCNC: 0.4 MG/DL (ref 0.2–1.2)
BUN SERPL-MCNC: 17 MG/DL (ref 7–25)
CALCIUM SERPL-MCNC: 9.4 MG/DL (ref 8.6–10.4)
CHLORIDE SERPL-SCNC: 101 MMOL/L (ref 98–110)
CO2 SERPL-SCNC: 27 MMOL/L (ref 20–32)
CREAT SERPL-MCNC: 0.65 MG/DL (ref 0.6–1)
EGFRCR SERPLBLD CKD-EPI 2021: 93 ML/MIN/1.73M2
GLUCOSE SERPL-MCNC: 90 MG/DL (ref 65–99)
POTASSIUM SERPL-SCNC: 4.3 MMOL/L (ref 3.5–5.3)
PROT SERPL-MCNC: 6.7 G/DL (ref 6.1–8.1)
SODIUM SERPL-SCNC: 135 MMOL/L (ref 135–146)

## 2025-03-04 DIAGNOSIS — D64.9 ANEMIA, UNSPECIFIED TYPE: Primary | ICD-10-CM

## 2025-03-04 LAB
BASOPHILS # BLD AUTO: 80 CELLS/UL (ref 0–200)
BASOPHILS NFR BLD AUTO: 2.5 %
EOSINOPHIL # BLD AUTO: 224 CELLS/UL (ref 15–500)
EOSINOPHIL NFR BLD AUTO: 7 %
ERYTHROCYTE [DISTWIDTH] IN BLOOD BY AUTOMATED COUNT: 14.9 % (ref 11–15)
FERRITIN SERPL-MCNC: 9 NG/ML (ref 16–288)
HCT VFR BLD AUTO: 31.3 % (ref 35–45)
HGB BLD-MCNC: 9.1 G/DL (ref 11.7–15.5)
IRON SATN MFR SERPL: 5 % (CALC) (ref 16–45)
IRON SERPL-MCNC: 19 MCG/DL (ref 45–160)
LYMPHOCYTES # BLD AUTO: 1296 CELLS/UL (ref 850–3900)
LYMPHOCYTES NFR BLD AUTO: 40.5 %
MCH RBC QN AUTO: 22.6 PG (ref 27–33)
MCHC RBC AUTO-ENTMCNC: 29.1 G/DL (ref 32–36)
MCV RBC AUTO: 77.9 FL (ref 80–100)
MONOCYTES # BLD AUTO: 477 CELLS/UL (ref 200–950)
MONOCYTES NFR BLD AUTO: 14.9 %
NEUTROPHILS # BLD AUTO: 1123 CELLS/UL (ref 1500–7800)
NEUTROPHILS NFR BLD AUTO: 35.1 %
PLATELET # BLD AUTO: 256 THOUSAND/UL (ref 140–400)
PMV BLD REES-ECKER: 12.9 FL (ref 7.5–12.5)
RBC # BLD AUTO: 4.02 MILLION/UL (ref 3.8–5.1)
TIBC SERPL-MCNC: 415 MCG/DL (CALC) (ref 250–450)
WBC # BLD AUTO: 3.2 THOUSAND/UL (ref 3.8–10.8)

## 2025-03-07 PROCEDURE — RXMED WILLOW AMBULATORY MEDICATION CHARGE

## 2025-03-10 ENCOUNTER — PHARMACY VISIT (OUTPATIENT)
Dept: PHARMACY | Facility: CLINIC | Age: 75
End: 2025-03-10
Payer: MEDICARE

## 2025-03-10 DIAGNOSIS — D64.9 ANEMIA, UNSPECIFIED TYPE: ICD-10-CM

## 2025-03-10 LAB
POC OCCULT BLOOD STOOL #1: NEGATIVE
POC OCCULT BLOOD STOOL #2: NEGATIVE
POC OCCULT BLOOD STOOL #3: NEGATIVE

## 2025-03-10 PROCEDURE — 82270 OCCULT BLOOD FECES: CPT | Performed by: FAMILY MEDICINE

## 2025-03-11 DIAGNOSIS — D64.9 ANEMIA, UNSPECIFIED TYPE: ICD-10-CM

## 2025-03-12 LAB
BASOPHILS # BLD AUTO: 89 CELLS/UL (ref 0–200)
BASOPHILS NFR BLD AUTO: 2.4 %
EOSINOPHIL # BLD AUTO: 340 CELLS/UL (ref 15–500)
EOSINOPHIL NFR BLD AUTO: 9.2 %
ERYTHROCYTE [DISTWIDTH] IN BLOOD BY AUTOMATED COUNT: 15.2 % (ref 11–15)
HCT VFR BLD AUTO: 30.2 % (ref 35–45)
HGB BLD-MCNC: 9 G/DL (ref 11.7–15.5)
LYMPHOCYTES # BLD AUTO: 1769 CELLS/UL (ref 850–3900)
LYMPHOCYTES NFR BLD AUTO: 47.8 %
MCH RBC QN AUTO: 22.6 PG (ref 27–33)
MCHC RBC AUTO-ENTMCNC: 29.8 G/DL (ref 32–36)
MCV RBC AUTO: 75.9 FL (ref 80–100)
MONOCYTES # BLD AUTO: 588 CELLS/UL (ref 200–950)
MONOCYTES NFR BLD AUTO: 15.9 %
NEUTROPHILS # BLD AUTO: 914 CELLS/UL (ref 1500–7800)
NEUTROPHILS NFR BLD AUTO: 24.7 %
PLATELET # BLD AUTO: 257 THOUSAND/UL (ref 140–400)
PMV BLD REES-ECKER: 12.5 FL (ref 7.5–12.5)
RBC # BLD AUTO: 3.98 MILLION/UL (ref 3.8–5.1)
WBC # BLD AUTO: 3.7 THOUSAND/UL (ref 3.8–10.8)

## 2025-03-17 ENCOUNTER — APPOINTMENT (OUTPATIENT)
Dept: PRIMARY CARE | Facility: CLINIC | Age: 75
End: 2025-03-17
Payer: MEDICARE

## 2025-03-17 VITALS
WEIGHT: 167 LBS | HEIGHT: 65 IN | DIASTOLIC BLOOD PRESSURE: 58 MMHG | TEMPERATURE: 97.3 F | OXYGEN SATURATION: 98 % | HEART RATE: 120 BPM | SYSTOLIC BLOOD PRESSURE: 110 MMHG | BODY MASS INDEX: 27.82 KG/M2 | RESPIRATION RATE: 18 BRPM

## 2025-03-17 DIAGNOSIS — D64.9 CHRONIC ANEMIA: Primary | ICD-10-CM

## 2025-03-17 DIAGNOSIS — D51.8 VITAMIN B12 DEFICIENCY ANEMIA AFTER GASTRECTOMY: ICD-10-CM

## 2025-03-17 DIAGNOSIS — Z90.3 VITAMIN B12 DEFICIENCY ANEMIA AFTER GASTRECTOMY: ICD-10-CM

## 2025-03-17 DIAGNOSIS — K91.89 VITAMIN B12 DEFICIENCY ANEMIA AFTER GASTRECTOMY: ICD-10-CM

## 2025-03-17 DIAGNOSIS — Z98.84 HISTORY OF ROUX-EN-Y GASTRIC BYPASS: ICD-10-CM

## 2025-03-17 PROCEDURE — 1123F ACP DISCUSS/DSCN MKR DOCD: CPT | Performed by: FAMILY MEDICINE

## 2025-03-17 PROCEDURE — 99213 OFFICE O/P EST LOW 20 MIN: CPT | Performed by: FAMILY MEDICINE

## 2025-03-17 PROCEDURE — 1036F TOBACCO NON-USER: CPT | Performed by: FAMILY MEDICINE

## 2025-03-17 PROCEDURE — G2211 COMPLEX E/M VISIT ADD ON: HCPCS | Performed by: FAMILY MEDICINE

## 2025-03-17 PROCEDURE — 1159F MED LIST DOCD IN RCRD: CPT | Performed by: FAMILY MEDICINE

## 2025-03-17 PROCEDURE — 3008F BODY MASS INDEX DOCD: CPT | Performed by: FAMILY MEDICINE

## 2025-03-17 PROCEDURE — 1160F RVW MEDS BY RX/DR IN RCRD: CPT | Performed by: FAMILY MEDICINE

## 2025-03-17 ASSESSMENT — ENCOUNTER SYMPTOMS
COUGH: 0
WHEEZING: 0
CHILLS: 0
HEMATURIA: 0
RHINORRHEA: 0
FEVER: 0
ABDOMINAL PAIN: 0
ANOREXIA: 0
TREMORS: 0
HEMATEMESIS: 0
HEMATOCHEZIA: 0
SHORTNESS OF BREATH: 0
HEADACHES: 0
LIGHT-HEADEDNESS: 0
SORE THROAT: 0
DYSURIA: 0
DIZZINESS: 0
CONSTIPATION: 0
DIARRHEA: 0
FREQUENCY: 0
VOMITING: 0
PALPITATIONS: 0
CONFUSION: 0
NUMBNESS: 0

## 2025-03-17 ASSESSMENT — ANXIETY QUESTIONNAIRES
3. WORRYING TOO MUCH ABOUT DIFFERENT THINGS: NOT AT ALL
IF YOU CHECKED OFF ANY PROBLEMS ON THIS QUESTIONNAIRE, HOW DIFFICULT HAVE THESE PROBLEMS MADE IT FOR YOU TO DO YOUR WORK, TAKE CARE OF THINGS AT HOME, OR GET ALONG WITH OTHER PEOPLE: NOT DIFFICULT AT ALL
4. TROUBLE RELAXING: NOT AT ALL
2. NOT BEING ABLE TO STOP OR CONTROL WORRYING: NOT AT ALL
GAD7 TOTAL SCORE: 0
7. FEELING AFRAID AS IF SOMETHING AWFUL MIGHT HAPPEN: NOT AT ALL
1. FEELING NERVOUS, ANXIOUS, OR ON EDGE: NOT AT ALL
5. BEING SO RESTLESS THAT IT IS HARD TO SIT STILL: NOT AT ALL
6. BECOMING EASILY ANNOYED OR IRRITABLE: NOT AT ALL

## 2025-03-17 ASSESSMENT — PATIENT HEALTH QUESTIONNAIRE - PHQ9
SUM OF ALL RESPONSES TO PHQ9 QUESTIONS 1 AND 2: 0
1. LITTLE INTEREST OR PLEASURE IN DOING THINGS: NOT AT ALL
2. FEELING DOWN, DEPRESSED OR HOPELESS: NOT AT ALL

## 2025-03-17 NOTE — PROGRESS NOTES
"Subjective   Patient ID: Rachel Mann is a 74 y.o. female who presents for Follow-up (Anemia and Lab Results).    Anemia  Presents for follow-up visit. Symptoms include malaise/fatigue. There has been no abdominal pain, anorexia, confusion, fever, light-headedness or palpitations. Signs of blood loss that are not present include hematemesis, hematochezia and melena.     Review of Systems   Constitutional:  Positive for malaise/fatigue. Negative for chills and fever.   HENT:  Negative for congestion, ear pain, nosebleeds, rhinorrhea and sore throat.    Respiratory:  Negative for cough, shortness of breath and wheezing.    Cardiovascular:  Negative for chest pain, palpitations and leg swelling.   Gastrointestinal:  Negative for abdominal pain, anorexia, constipation, diarrhea, hematemesis, hematochezia, melena and vomiting.   Genitourinary:  Negative for dysuria, frequency and hematuria.   Neurological:  Negative for dizziness, tremors, light-headedness, numbness and headaches.   Psychiatric/Behavioral:  Negative for confusion.        Objective   /58 (BP Location: Left arm, Patient Position: Sitting)   Pulse (!) 120   Temp 36.3 °C (97.3 °F)   Resp 18   Ht 1.651 m (5' 5\")   Wt 75.8 kg (167 lb)   SpO2 98%   BMI 27.79 kg/m²     Physical Exam  Constitutional:       General: She is not in acute distress.     Appearance: Normal appearance.   HENT:      Head: Normocephalic and atraumatic.      Mouth/Throat:      Mouth: Mucous membranes are moist.      Pharynx: Oropharynx is clear. No oropharyngeal exudate or posterior oropharyngeal erythema.   Eyes:      General: No scleral icterus.     Extraocular Movements: Extraocular movements intact.      Pupils: Pupils are equal, round, and reactive to light.   Cardiovascular:      Rate and Rhythm: Normal rate and regular rhythm.      Pulses: Normal pulses.      Heart sounds: No murmur heard.     No friction rub. No gallop.   Pulmonary:      Effort: Pulmonary effort is " normal.      Breath sounds: No wheezing, rhonchi or rales.   Skin:     General: Skin is warm.      Coloration: Skin is not jaundiced or pale.      Findings: No erythema or rash.   Neurological:      General: No focal deficit present.      Mental Status: She is alert and oriented to person, place, and time.      Cranial Nerves: No cranial nerve deficit.      Sensory: No sensory deficit.      Coordination: Coordination normal.      Gait: Gait normal.         Orders Only on 03/11/2025   Component Date Value Ref Range Status    WHITE BLOOD CELL COUNT 03/12/2025 3.7 (L)  3.8 - 10.8 Thousand/uL Final    RED BLOOD CELL COUNT 03/12/2025 3.98  3.80 - 5.10 Million/uL Final    HEMOGLOBIN 03/12/2025 9.0 (L)  11.7 - 15.5 g/dL Final    HEMATOCRIT 03/12/2025 30.2 (L)  35.0 - 45.0 % Final    MCV 03/12/2025 75.9 (L)  80.0 - 100.0 fL Final    MCH 03/12/2025 22.6 (L)  27.0 - 33.0 pg Final    MCHC 03/12/2025 29.8 (L)  32.0 - 36.0 g/dL Final    Comment: For adults, a slight decrease in the calculated MCHC  value (in the range of 30 to 32 g/dL) is most likely  not clinically significant; however, it should be  interpreted with caution in correlation with other  red cell parameters and the patient's clinical  condition.      RDW 03/12/2025 15.2 (H)  11.0 - 15.0 % Final    PLATELET COUNT 03/12/2025 257  140 - 400 Thousand/uL Final    MPV 03/12/2025 12.5  7.5 - 12.5 fL Final    ABSOLUTE NEUTROPHILS 03/12/2025 914 (L)  1,500 - 7,800 cells/uL Final    ABSOLUTE LYMPHOCYTES 03/12/2025 1,769  850 - 3,900 cells/uL Final    ABSOLUTE MONOCYTES 03/12/2025 588  200 - 950 cells/uL Final    ABSOLUTE EOSINOPHILS 03/12/2025 340  15 - 500 cells/uL Final    ABSOLUTE BASOPHILS 03/12/2025 89  0 - 200 cells/uL Final    NEUTROPHILS 03/12/2025 24.7  % Final    LYMPHOCYTES 03/12/2025 47.8  % Final    MONOCYTES 03/12/2025 15.9  % Final    EOSINOPHILS 03/12/2025 9.2  % Final    BASOPHILS 03/12/2025 2.4  % Final       Assessment/Plan   Problem List Items Addressed  This Visit       Chronic anemia - Primary     We will Refer her to the Gastroenterologist and the Hematologist for further evaluation.         Relevant Orders    Referral to Gastroenterology    Referral To Hematology and Oncology    CBC and Auto Differential    History of Mihaela-en-Y gastric bypass    Relevant Orders    Vitamin B12    Folate     Other Visit Diagnoses       Vitamin B12 deficiency anemia after gastrectomy        Relevant Orders    Vitamin B12    Folate          Scribe Attestation  By signing my name below, IDavid Scribe   attest that this documentation has been prepared under the direction and in the presence of Mat Tatum MD.

## 2025-03-18 ENCOUNTER — APPOINTMENT (OUTPATIENT)
Dept: PRIMARY CARE | Facility: CLINIC | Age: 75
End: 2025-03-18
Payer: MEDICARE

## 2025-03-19 ENCOUNTER — TELEPHONE (OUTPATIENT)
Dept: HEMATOLOGY/ONCOLOGY | Facility: CLINIC | Age: 75
End: 2025-03-19
Payer: MEDICARE

## 2025-03-19 LAB
BASOPHILS # BLD AUTO: 78 CELLS/UL (ref 0–200)
BASOPHILS NFR BLD AUTO: 1.6 %
EOSINOPHIL # BLD AUTO: 309 CELLS/UL (ref 15–500)
EOSINOPHIL NFR BLD AUTO: 6.3 %
ERYTHROCYTE [DISTWIDTH] IN BLOOD BY AUTOMATED COUNT: 15.1 % (ref 11–15)
FOLATE SERPL-MCNC: >24 NG/ML
HCT VFR BLD AUTO: 29.5 % (ref 35–45)
HGB BLD-MCNC: 8.9 G/DL (ref 11.7–15.5)
LYMPHOCYTES # BLD AUTO: 1842 CELLS/UL (ref 850–3900)
LYMPHOCYTES NFR BLD AUTO: 37.6 %
MCH RBC QN AUTO: 22.9 PG (ref 27–33)
MCHC RBC AUTO-ENTMCNC: 30.2 G/DL (ref 32–36)
MCV RBC AUTO: 76 FL (ref 80–100)
MONOCYTES # BLD AUTO: 681 CELLS/UL (ref 200–950)
MONOCYTES NFR BLD AUTO: 13.9 %
NEUTROPHILS # BLD AUTO: 1989 CELLS/UL (ref 1500–7800)
NEUTROPHILS NFR BLD AUTO: 40.6 %
PLATELET # BLD AUTO: 248 THOUSAND/UL (ref 140–400)
PMV BLD REES-ECKER: 11.4 FL (ref 7.5–12.5)
RBC # BLD AUTO: 3.88 MILLION/UL (ref 3.8–5.1)
VIT B12 SERPL-MCNC: >2000 PG/ML (ref 200–1100)
WBC # BLD AUTO: 4.9 THOUSAND/UL (ref 3.8–10.8)

## 2025-03-19 NOTE — TELEPHONE ENCOUNTER
Dr. Tatum's office calling to see if appointment can be moved up with Dr. Nielsen?  Currently scheduled for 4/28/25.

## 2025-03-20 NOTE — TELEPHONE ENCOUNTER
Sooner appointment open on Dr. Nielsen's schedule.  Spoke with patient and is agreeable to moving appointment to 4/4/25 @ 11am.

## 2025-03-21 ENCOUNTER — PATIENT OUTREACH (OUTPATIENT)
Dept: PRIMARY CARE | Facility: CLINIC | Age: 75
End: 2025-03-21
Payer: MEDICARE

## 2025-03-31 PROCEDURE — RXMED WILLOW AMBULATORY MEDICATION CHARGE

## 2025-04-02 ENCOUNTER — PHARMACY VISIT (OUTPATIENT)
Dept: PHARMACY | Facility: CLINIC | Age: 75
End: 2025-04-02
Payer: MEDICARE

## 2025-04-02 NOTE — PROGRESS NOTES
Patient ID: Rachel Mann is a 74 y.o. female.  Referring Physician: Mat Tatum MD  [address] [place] [address]  Primary Care Provider: Mat Tatum MD      Subjective    HPI  Rachel Mann is a 74 y.o. female presenting for initial consultation at the request of Dr. Mat Tatum for chronic anemia. Blood work on 3/18/25, shows CBC, Hb 8.9 g/dL with a low MCV of 76 fL. Looking back at labs, Hb has been in the 8 range since 2025. Just prior, was normal in the 12 range since 2019 was 10.8 g/dL, no other cytopenias. Folate and B12 are unremarkable. Iron studies low on 3/3/25, iron saturation 5%, ferritin 9. No report of colonoscopy of EGD. She does have a history of rheumatoid arthritis    She presents today with her  and daughter. She reports feeling well with no major issues. She has been taking oral iron and has been off methotrexate and Plaquenil. She receives Simponi (Golimumab) injections every 8 weeks since 2024. She denies abdominal pain, stool changes.     Past surgical history:  Bariatric history,     Family history:  No history of blood cancers  Paternal aunt - colon cancer,  age 68/70 years      Review of Systems - Oncology   Review of Systems:    Positive per HPI, otherwise negative.       Objective   BSA: 1.84 meters squared  /76 (BP Location: Right arm, Patient Position: Sitting)   Pulse 87   Temp 36.1 °C (97 °F) (Temporal)   Resp 17   Wt 73.8 kg (162 lb 11.2 oz)   SpO2 97%   BMI 27.07 kg/m²     Family History   Problem Relation Name Age of Onset    Arthritis Mother Sylvia Hampton     Diabetes Mother Sylvia Hampton     Stroke Mother Sylvia Hampton     Osteoporosis Mother Sylvia Hampton     Alcohol abuse Father W. Hampton     COPD Father W. Hampton      Oncology History    No history exists.       Rachel Mann  reports that she has never smoked. She has never used smokeless tobacco.  She  reports no history of alcohol use.  She  reports no history of  drug use.    Physical Exam  Gen: appears well in clinic, NAD  HEENT: atraumatic head, normocephalic, EOMI, conjunctiva normal  LUNG: no increased WOB, CTAB  CV: No JVD. RRR  GI: soft, NT, ND  LE: no LE edema  Skin: no obvious rashes or lesions on visible skin  Neuro: interactive, no focal deficits noted  Psych: normal mood and affect      Performance Status:  Symptomatic; fully ambulatory    Labs/Imaging/Pathology: Personally reviewed reports and images in Epic electronic medical record system. Pertinent results as it related to the plan represented in below in assessment and plan.     Component      Latest Ref Rng 3/12/2025 3/18/2025   WHITE BLOOD CELL COUNT      3.8 - 10.8 Thousand/uL 3.7 (L)  4.9    RED BLOOD CELL COUNT      3.80 - 5.10 Million/uL 3.98  3.88    HEMOGLOBIN      11.7 - 15.5 g/dL 9.0 (L)  8.9 (L)    HEMATOCRIT      35.0 - 45.0 % 30.2 (L)  29.5 (L)    MCV      80.0 - 100.0 fL 75.9 (L)  76.0 (L)    MCH      27.0 - 33.0 pg 22.6 (L)  22.9 (L)    MCHC      32.0 - 36.0 g/dL 29.8 (L)  30.2 (L)    RDW      11.0 - 15.0 % 15.2 (H)  15.1 (H)    PLATELET COUNT      140 - 400 Thousand/uL 257  248    MPV      7.5 - 12.5 fL 12.5  11.4    ABSOLUTE NEUTROPHILS      1,500 - 7,800 cells/uL 914 (L)  1,989    ABSOLUTE LYMPHOCYTES      850 - 3,900 cells/uL 1,769  1,842    ABSOLUTE MONOCYTES      200 - 950 cells/uL 588  681    ABSOLUTE EOSINOPHILS      15 - 500 cells/uL 340  309    ABSOLUTE BASOPHILS      0 - 200 cells/uL 89  78    NEUTROPHILS      % 24.7  40.6    LYMPHOCYTES      % 47.8  37.6    MONOCYTES      % 15.9  13.9    EOSINOPHILS      % 9.2  6.3    BASOPHILS      % 2.4  1.6       Legend:  (L) Low  (H) High    Component      Latest Ref Rng 3/3/2025   IRON, TOTAL      45 - 160 mcg/dL 19 (L)    IRON BINDING CAPACITY      250 - 450 mcg/dL (calc) 415    % SATURATION      16 - 45 % (calc) 5 (L)       Legend:  (L) Low    Component      Latest Ref Rng 3/3/2025   FERRITIN      16 - 288 ng/mL 9 (L)       Legend:  (L)  Low    Assessment/Plan    Iron deficiency anemia   - Today we discussed that iron deficiency anemia is likely multifactorial   - Given history of bariatric surgery years ago, likely contributing to iron malabsorption   - Also has rheumatoid arthritis and there is likely a component of anemia of chronic disease   - However, given persistent iron deficiency anemia and no recent GI workup would recommend colonoscopy and EGD to rule out occult GI blood loss   - Has GI follow up in June   - We did discuss that a primary bone marrow pathology is also in the differential   - However, would assess if iron is replete and continues to be anemic, would plan for a round of IV venofer and follow up in 3 months   - She denies stool changes, weight loss, night sweats, fever   - No lymphadenopathy or splenomegaly on exam today   - RTC in 3 month with repeat labs including iron studies      RTC in 3 months  This note has been transcribed using a medical scribe and there is a possibility of unintentional typing misprints      Diagnoses and all orders for this visit:  Iron malabsorption (HHS-HCC)  -     Clinic Appointment Request; Future  -     Iron and TIBC; Future  -     CBC and Auto Differential; Future  -     Comprehensive metabolic panel; Future  -     Ferritin; Future  Chronic anemia  -     Referral To Hematology and Oncology  Other iron deficiency anemia  -     Clinic Appointment Request; Future  -     Iron and TIBC; Future  -     CBC and Auto Differential; Future  -     Comprehensive metabolic panel; Future  -     Ferritin; Future  Other orders  -     heparin flush 10 unit/mL syringe 50 Units  -     heparin flush 100 unit/mL syringe 500 Units  -     alteplase (Cathflo Activase) injection 2 mg  -     iron sucrose (Venofer) 300 mg in sodium chloride 0.9% 250 mL IV  -     sodium chloride 0.9 % bolus 500 mL  -     dextrose 5 % in water (D5W) bolus 500 mL  -     diphenhydrAMINE (BENADryl) injection 50 mg  -     methylPREDNISolone  sod succinate (SOLU-Medrol) 40 mg/mL injection 40 mg  -     famotidine PF (Pepcid) injection 20 mg  -     EPINEPHrine (Epipen) injection syringe 0.3 mg  -     albuterol 2.5 mg /3 mL (0.083 %) nebulizer solution 3 mL      Diana Nielsen MD  Hematology/Oncology  [facility]      Scribe Attestation  By signing my name below, IKarmen Scribe, attest that this documentation has been prepared under the direction and in the presence of Diana Nielsen MD.  Time Spent  Prep time on day of patient encounter: 5 minutes  Time spent directly with patient, family or caregiver: 25 minutes  Additional Time Spent on Patient Care Activities: 5 minutes  Documentation Time: 5 minutes  Other Time Spent: 0 minutes  Total: 40 minutes

## 2025-04-04 ENCOUNTER — OFFICE VISIT (OUTPATIENT)
Dept: HEMATOLOGY/ONCOLOGY | Facility: CLINIC | Age: 75
End: 2025-04-04
Payer: MEDICARE

## 2025-04-04 VITALS
OXYGEN SATURATION: 97 % | RESPIRATION RATE: 17 BRPM | HEART RATE: 87 BPM | WEIGHT: 162.7 LBS | TEMPERATURE: 97 F | SYSTOLIC BLOOD PRESSURE: 158 MMHG | DIASTOLIC BLOOD PRESSURE: 76 MMHG | BODY MASS INDEX: 27.07 KG/M2

## 2025-04-04 DIAGNOSIS — D64.9 CHRONIC ANEMIA: ICD-10-CM

## 2025-04-04 DIAGNOSIS — K90.9 IRON MALABSORPTION (HHS-HCC): Primary | ICD-10-CM

## 2025-04-04 DIAGNOSIS — D50.8 OTHER IRON DEFICIENCY ANEMIA: ICD-10-CM

## 2025-04-04 PROBLEM — E61.1 IRON DEFICIENCY: Status: ACTIVE | Noted: 2025-04-04

## 2025-04-04 PROBLEM — D50.9 IRON DEFICIENCY ANEMIA: Status: ACTIVE | Noted: 2025-04-04

## 2025-04-04 PROCEDURE — 1159F MED LIST DOCD IN RCRD: CPT | Performed by: INTERNAL MEDICINE

## 2025-04-04 PROCEDURE — RXMED WILLOW AMBULATORY MEDICATION CHARGE

## 2025-04-04 PROCEDURE — 99204 OFFICE O/P NEW MOD 45 MIN: CPT | Performed by: INTERNAL MEDICINE

## 2025-04-04 PROCEDURE — 1125F AMNT PAIN NOTED PAIN PRSNT: CPT | Performed by: INTERNAL MEDICINE

## 2025-04-04 PROCEDURE — 1123F ACP DISCUSS/DSCN MKR DOCD: CPT | Performed by: INTERNAL MEDICINE

## 2025-04-04 PROCEDURE — 99214 OFFICE O/P EST MOD 30 MIN: CPT | Performed by: INTERNAL MEDICINE

## 2025-04-04 PROCEDURE — 1036F TOBACCO NON-USER: CPT | Performed by: INTERNAL MEDICINE

## 2025-04-04 RX ORDER — ALBUTEROL SULFATE 0.83 MG/ML
3 SOLUTION RESPIRATORY (INHALATION) AS NEEDED
OUTPATIENT
Start: 2025-04-09

## 2025-04-04 RX ORDER — DIPHENHYDRAMINE HYDROCHLORIDE 50 MG/ML
50 INJECTION, SOLUTION INTRAMUSCULAR; INTRAVENOUS AS NEEDED
OUTPATIENT
Start: 2025-04-09

## 2025-04-04 RX ORDER — EPINEPHRINE 0.3 MG/.3ML
0.3 INJECTION SUBCUTANEOUS EVERY 5 MIN PRN
OUTPATIENT
Start: 2025-04-09

## 2025-04-04 RX ORDER — HEPARIN 100 UNIT/ML
500 SYRINGE INTRAVENOUS AS NEEDED
OUTPATIENT
Start: 2025-04-04

## 2025-04-04 RX ORDER — HEPARIN SODIUM,PORCINE/PF 10 UNIT/ML
50 SYRINGE (ML) INTRAVENOUS AS NEEDED
OUTPATIENT
Start: 2025-04-04

## 2025-04-04 RX ORDER — FAMOTIDINE 10 MG/ML
20 INJECTION, SOLUTION INTRAVENOUS ONCE AS NEEDED
OUTPATIENT
Start: 2025-04-09

## 2025-04-04 ASSESSMENT — ENCOUNTER SYMPTOMS
OCCASIONAL FEELINGS OF UNSTEADINESS: 1
DEPRESSION: 0
LOSS OF SENSATION IN FEET: 1

## 2025-04-04 ASSESSMENT — PAIN SCALES - GENERAL: PAINLEVEL_OUTOF10: 3

## 2025-04-04 ASSESSMENT — PATIENT HEALTH QUESTIONNAIRE - PHQ9
1. LITTLE INTEREST OR PLEASURE IN DOING THINGS: NOT AT ALL
SUM OF ALL RESPONSES TO PHQ9 QUESTIONS 1 AND 2: 0
2. FEELING DOWN, DEPRESSED OR HOPELESS: NOT AT ALL

## 2025-04-05 ENCOUNTER — PHARMACY VISIT (OUTPATIENT)
Dept: PHARMACY | Facility: CLINIC | Age: 75
End: 2025-04-05
Payer: MEDICARE

## 2025-04-08 ENCOUNTER — INFUSION (OUTPATIENT)
Dept: HEMATOLOGY/ONCOLOGY | Facility: CLINIC | Age: 75
End: 2025-04-08
Payer: MEDICARE

## 2025-04-08 VITALS
WEIGHT: 160 LBS | BODY MASS INDEX: 26.66 KG/M2 | OXYGEN SATURATION: 98 % | HEART RATE: 76 BPM | TEMPERATURE: 98.8 F | HEIGHT: 65 IN | RESPIRATION RATE: 20 BRPM | SYSTOLIC BLOOD PRESSURE: 125 MMHG | DIASTOLIC BLOOD PRESSURE: 64 MMHG

## 2025-04-08 DIAGNOSIS — K90.9 IRON MALABSORPTION (HHS-HCC): ICD-10-CM

## 2025-04-08 DIAGNOSIS — D50.8 OTHER IRON DEFICIENCY ANEMIA: ICD-10-CM

## 2025-04-08 PROCEDURE — 96365 THER/PROPH/DIAG IV INF INIT: CPT | Mod: INF

## 2025-04-08 PROCEDURE — 2500000004 HC RX 250 GENERAL PHARMACY W/ HCPCS (ALT 636 FOR OP/ED): Performed by: INTERNAL MEDICINE

## 2025-04-08 RX ORDER — ALBUTEROL SULFATE 0.83 MG/ML
3 SOLUTION RESPIRATORY (INHALATION) AS NEEDED
OUTPATIENT
Start: 2025-04-12

## 2025-04-08 RX ORDER — DIPHENHYDRAMINE HYDROCHLORIDE 50 MG/ML
50 INJECTION, SOLUTION INTRAMUSCULAR; INTRAVENOUS AS NEEDED
OUTPATIENT
Start: 2025-04-12

## 2025-04-08 RX ORDER — EPINEPHRINE 0.3 MG/.3ML
0.3 INJECTION SUBCUTANEOUS EVERY 5 MIN PRN
OUTPATIENT
Start: 2025-04-12

## 2025-04-08 RX ORDER — FAMOTIDINE 10 MG/ML
20 INJECTION, SOLUTION INTRAVENOUS ONCE AS NEEDED
OUTPATIENT
Start: 2025-04-12

## 2025-04-08 RX ADMIN — IRON SUCROSE 300 MG: 20 INJECTION, SOLUTION INTRAVENOUS at 13:15

## 2025-04-08 ASSESSMENT — PAIN SCALES - GENERAL: PAINLEVEL_OUTOF10: 3

## 2025-04-08 NOTE — PROGRESS NOTES
1515:  Patient received Venofer 300 mg infusion (#1/3) without sign of adverse reaction.  To return on Friday.  Discharged in stable condition.

## 2025-04-11 ENCOUNTER — PHARMACY VISIT (OUTPATIENT)
Dept: PHARMACY | Facility: CLINIC | Age: 75
End: 2025-04-11
Payer: MEDICARE

## 2025-04-11 PROCEDURE — RXMED WILLOW AMBULATORY MEDICATION CHARGE

## 2025-04-11 RX ORDER — METHOTREXATE 2.5 MG/1
12.5 TABLET ORAL
Qty: 60 TABLET | Refills: 1 | OUTPATIENT
Start: 2025-04-13

## 2025-04-11 RX ORDER — FOLIC ACID 1 MG/1
1 TABLET ORAL DAILY
Qty: 90 TABLET | Refills: 1 | OUTPATIENT
Start: 2025-04-11

## 2025-04-14 ENCOUNTER — INFUSION (OUTPATIENT)
Dept: HEMATOLOGY/ONCOLOGY | Facility: CLINIC | Age: 75
End: 2025-04-14
Payer: MEDICARE

## 2025-04-14 VITALS
DIASTOLIC BLOOD PRESSURE: 64 MMHG | SYSTOLIC BLOOD PRESSURE: 132 MMHG | RESPIRATION RATE: 20 BRPM | OXYGEN SATURATION: 98 % | TEMPERATURE: 97.5 F | HEART RATE: 88 BPM

## 2025-04-14 DIAGNOSIS — K90.9 IRON MALABSORPTION (HHS-HCC): ICD-10-CM

## 2025-04-14 DIAGNOSIS — D50.8 OTHER IRON DEFICIENCY ANEMIA: ICD-10-CM

## 2025-04-14 PROCEDURE — 2500000004 HC RX 250 GENERAL PHARMACY W/ HCPCS (ALT 636 FOR OP/ED): Performed by: INTERNAL MEDICINE

## 2025-04-14 PROCEDURE — 96365 THER/PROPH/DIAG IV INF INIT: CPT | Mod: INF

## 2025-04-14 RX ORDER — DIPHENHYDRAMINE HYDROCHLORIDE 50 MG/ML
50 INJECTION, SOLUTION INTRAMUSCULAR; INTRAVENOUS AS NEEDED
Status: CANCELLED | OUTPATIENT
Start: 2025-04-16

## 2025-04-14 RX ORDER — ALBUTEROL SULFATE 0.83 MG/ML
3 SOLUTION RESPIRATORY (INHALATION) AS NEEDED
Status: CANCELLED | OUTPATIENT
Start: 2025-04-16

## 2025-04-14 RX ORDER — EPINEPHRINE 0.3 MG/.3ML
0.3 INJECTION SUBCUTANEOUS EVERY 5 MIN PRN
Status: CANCELLED | OUTPATIENT
Start: 2025-04-16

## 2025-04-14 RX ORDER — FAMOTIDINE 10 MG/ML
20 INJECTION, SOLUTION INTRAVENOUS ONCE AS NEEDED
Status: CANCELLED | OUTPATIENT
Start: 2025-04-16

## 2025-04-14 RX ADMIN — IRON SUCROSE 300 MG: 20 INJECTION, SOLUTION INTRAVENOUS at 09:02

## 2025-04-14 ASSESSMENT — PAIN SCALES - GENERAL: PAINLEVEL_OUTOF10: 2

## 2025-04-14 NOTE — PROGRESS NOTES
1040:  Patient received Venofer 300 mg infusion (#2/3) without sign of adverse reaction.  To return on Friday.  Discharged in stable condition.

## 2025-04-18 ENCOUNTER — INFUSION (OUTPATIENT)
Dept: HEMATOLOGY/ONCOLOGY | Facility: CLINIC | Age: 75
End: 2025-04-18
Payer: MEDICARE

## 2025-04-18 VITALS
HEART RATE: 67 BPM | SYSTOLIC BLOOD PRESSURE: 122 MMHG | OXYGEN SATURATION: 100 % | DIASTOLIC BLOOD PRESSURE: 67 MMHG | RESPIRATION RATE: 20 BRPM | TEMPERATURE: 98.1 F

## 2025-04-18 DIAGNOSIS — K90.9 IRON MALABSORPTION (HHS-HCC): ICD-10-CM

## 2025-04-18 DIAGNOSIS — D50.8 OTHER IRON DEFICIENCY ANEMIA: ICD-10-CM

## 2025-04-18 PROCEDURE — 2500000004 HC RX 250 GENERAL PHARMACY W/ HCPCS (ALT 636 FOR OP/ED): Mod: JZ | Performed by: INTERNAL MEDICINE

## 2025-04-18 PROCEDURE — 96365 THER/PROPH/DIAG IV INF INIT: CPT | Mod: INF

## 2025-04-18 RX ORDER — ALBUTEROL SULFATE 0.83 MG/ML
3 SOLUTION RESPIRATORY (INHALATION) AS NEEDED
OUTPATIENT
Start: 2025-04-18

## 2025-04-18 RX ORDER — FAMOTIDINE 10 MG/ML
20 INJECTION, SOLUTION INTRAVENOUS ONCE AS NEEDED
OUTPATIENT
Start: 2025-04-18

## 2025-04-18 RX ORDER — DIPHENHYDRAMINE HYDROCHLORIDE 50 MG/ML
50 INJECTION, SOLUTION INTRAMUSCULAR; INTRAVENOUS AS NEEDED
OUTPATIENT
Start: 2025-04-18

## 2025-04-18 RX ORDER — EPINEPHRINE 0.3 MG/.3ML
0.3 INJECTION SUBCUTANEOUS EVERY 5 MIN PRN
OUTPATIENT
Start: 2025-04-18

## 2025-04-18 RX ADMIN — IRON SUCROSE 300 MG: 20 INJECTION, SOLUTION INTRAVENOUS at 08:47

## 2025-04-18 ASSESSMENT — PAIN SCALES - GENERAL: PAINLEVEL_OUTOF10: 2

## 2025-04-18 NOTE — PROGRESS NOTES
1022:  Patient received Venofer 300 mg infusion (#3/3) without sign of adverse reaction.  To follow up with doctor.  Discharged in stable condition.

## 2025-04-28 ENCOUNTER — APPOINTMENT (OUTPATIENT)
Dept: HEMATOLOGY/ONCOLOGY | Facility: CLINIC | Age: 75
End: 2025-04-28
Payer: MEDICARE

## 2025-04-28 ENCOUNTER — HOSPITAL ENCOUNTER (OUTPATIENT)
Dept: RADIOLOGY | Facility: HOSPITAL | Age: 75
Discharge: HOME | End: 2025-04-28
Payer: MEDICARE

## 2025-04-28 DIAGNOSIS — R91.8 LUNG NODULES: ICD-10-CM

## 2025-04-28 PROCEDURE — 71250 CT THORAX DX C-: CPT | Performed by: RADIOLOGY

## 2025-04-28 PROCEDURE — 71250 CT THORAX DX C-: CPT

## 2025-05-01 DIAGNOSIS — K76.9 LIVER LESION: ICD-10-CM

## 2025-05-01 DIAGNOSIS — R16.0 LIVER MASS: ICD-10-CM

## 2025-05-01 DIAGNOSIS — E04.9 GOITER: ICD-10-CM

## 2025-05-01 DIAGNOSIS — R91.8 LUNG NODULES: ICD-10-CM

## 2025-05-06 ENCOUNTER — HOSPITAL ENCOUNTER (OUTPATIENT)
Dept: RADIOLOGY | Facility: HOSPITAL | Age: 75
Discharge: HOME | End: 2025-05-06
Payer: MEDICARE

## 2025-05-06 DIAGNOSIS — K76.9 LIVER LESION: ICD-10-CM

## 2025-05-06 DIAGNOSIS — R16.0 LIVER MASS: ICD-10-CM

## 2025-05-06 DIAGNOSIS — E04.9 GOITER: ICD-10-CM

## 2025-05-06 PROCEDURE — 76536 US EXAM OF HEAD AND NECK: CPT

## 2025-05-06 PROCEDURE — 76705 ECHO EXAM OF ABDOMEN: CPT | Performed by: RADIOLOGY

## 2025-05-06 PROCEDURE — 76536 US EXAM OF HEAD AND NECK: CPT | Performed by: RADIOLOGY

## 2025-05-06 PROCEDURE — 76705 ECHO EXAM OF ABDOMEN: CPT

## 2025-05-09 PROCEDURE — RXMED WILLOW AMBULATORY MEDICATION CHARGE

## 2025-05-15 ENCOUNTER — PHARMACY VISIT (OUTPATIENT)
Dept: PHARMACY | Facility: CLINIC | Age: 75
End: 2025-05-15
Payer: MEDICARE

## 2025-05-15 PROCEDURE — RXMED WILLOW AMBULATORY MEDICATION CHARGE

## 2025-05-15 RX ORDER — FAMOTIDINE 40 MG/1
40 TABLET, FILM COATED ORAL DAILY
Qty: 30 TABLET | Refills: 3 | OUTPATIENT
Start: 2025-05-15

## 2025-05-28 PROCEDURE — RXMED WILLOW AMBULATORY MEDICATION CHARGE

## 2025-05-31 ENCOUNTER — PHARMACY VISIT (OUTPATIENT)
Dept: PHARMACY | Facility: CLINIC | Age: 75
End: 2025-05-31
Payer: MEDICARE

## 2025-06-09 ENCOUNTER — PHARMACY VISIT (OUTPATIENT)
Dept: PHARMACY | Facility: CLINIC | Age: 75
End: 2025-06-09
Payer: MEDICARE

## 2025-06-09 PROCEDURE — RXMED WILLOW AMBULATORY MEDICATION CHARGE

## 2025-06-09 RX ORDER — METHYLPREDNISOLONE 4 MG/1
TABLET ORAL
Qty: 21 EACH | Refills: 0 | OUTPATIENT
Start: 2025-06-09

## 2025-06-16 ENCOUNTER — APPOINTMENT (OUTPATIENT)
Dept: GASTROENTEROLOGY | Facility: CLINIC | Age: 75
End: 2025-06-16
Payer: MEDICARE

## 2025-06-16 VITALS
HEIGHT: 64 IN | HEART RATE: 85 BPM | DIASTOLIC BLOOD PRESSURE: 81 MMHG | OXYGEN SATURATION: 96 % | RESPIRATION RATE: 16 BRPM | BODY MASS INDEX: 25.1 KG/M2 | SYSTOLIC BLOOD PRESSURE: 137 MMHG | WEIGHT: 147 LBS

## 2025-06-16 DIAGNOSIS — D50.9 IRON DEFICIENCY ANEMIA, UNSPECIFIED IRON DEFICIENCY ANEMIA TYPE: Primary | ICD-10-CM

## 2025-06-16 DIAGNOSIS — D64.9 CHRONIC ANEMIA: ICD-10-CM

## 2025-06-16 PROCEDURE — 3008F BODY MASS INDEX DOCD: CPT | Performed by: STUDENT IN AN ORGANIZED HEALTH CARE EDUCATION/TRAINING PROGRAM

## 2025-06-16 PROCEDURE — 99204 OFFICE O/P NEW MOD 45 MIN: CPT | Performed by: STUDENT IN AN ORGANIZED HEALTH CARE EDUCATION/TRAINING PROGRAM

## 2025-06-16 PROCEDURE — G2211 COMPLEX E/M VISIT ADD ON: HCPCS | Performed by: STUDENT IN AN ORGANIZED HEALTH CARE EDUCATION/TRAINING PROGRAM

## 2025-06-16 PROCEDURE — 1159F MED LIST DOCD IN RCRD: CPT | Performed by: STUDENT IN AN ORGANIZED HEALTH CARE EDUCATION/TRAINING PROGRAM

## 2025-06-16 PROCEDURE — 1036F TOBACCO NON-USER: CPT | Performed by: STUDENT IN AN ORGANIZED HEALTH CARE EDUCATION/TRAINING PROGRAM

## 2025-06-16 NOTE — PROGRESS NOTES
CC: Iron deficiency anemia.    History of Present Illness:   Rachel Mann is a 74 y.o. female with a PMH of HLD, osteoporosis, Mihaela-en-Y gastric bypass, RA who presents to clinic for iron deficiency anemia.  Last hemoglobin: 8.9.  Iron studies show iron deficiency anemia. Patient follows with Hematology. Recently did 3 iron infusions. No GI issues.     RUQ US 5/2025: Cholelithiasis.  Colonoscopy 2004: 2 polyps.     Review of Systems  ROS Negative unless otherwise stated above.    Past Medical/Surgical History  Medical History[1]   Surgical History[2]     Social History   reports that she has never smoked. She has never used smokeless tobacco. She reports that she does not drink alcohol and does not use drugs.     Family History  family history includes Alcohol abuse in her father; Arthritis in her mother; COPD in her father; Colon cancer in her father's sister; Diabetes in her mother; Osteoporosis in her mother; Stroke in her mother.     Allergies  RX Allergies[3]    Medications  Current Outpatient Medications   Medication Instructions    b complex vitamins capsule Vitamin B Complex CAPS   Refills: 0       Active    calcium 500 mg calcium (1,250 mg) tablet Calcium 500 MG TABS   Refills: 0       Active    celecoxib (CeleBREX) 200 mg capsule Take 1 capsule (200 mg) by mouth once daily with food.    cholecalciferol (Vitamin D-3) 50 mcg (2,000 unit) capsule Vitamin D3 50 MCG (2000 UT) Oral Capsule   Refills: 0       Active    famotidine (PEPCID) 40 mg, oral, Nightly    folic acid (FOLVITE) 1 mg, oral, Daily    folic acid (FOLVITE) 1 mg, oral, Daily    golimumab (SIMPONI ARIA IV) Infuse into a venous catheter.    hydroxychloroquine (Plaquenil) 200 mg tablet Take 2 tablets (400 mg) by mouth once daily with food.    hydroxychloroquine (PLAQUENIL) 400 mg, oral, Daily    magnesium 250 mg tablet 1 tablet, Daily    methotrexate (TREXALL) 12.5 mg, oral, Once Weekly    methylPREDNISolone (Medrol, Elmer,) 4 mg tablets Take by mouth  as directed per package instructions.    sulfaSALAzine (Azulfidine) 500 mg tablet Take 1 tablet (500 mg) by mouth 2 times a day for 7 days, THEN increase to 2 tablets (1,000 mg) 2 times a day if no side effects.    sulfaSALAzine (AZULFIDINE) 1,000 mg, oral, 2 times daily    vit C-Zn gluc-herbal no.325 (Elderberry Zinc Vit C) 90-15 mg lozenge Take 1 daily    zoledronic acid (Reclast) 5 mg/100 mL piggyback INFUSE 5MG INTRAVENOUSLY OVER 15 MINUTES AS DIRECTED.        Objective   There were no vitals taken for this visit.     General: A&Ox3, NAD.  HEENT: AT/NC.   CV: RRR. No murmur.  Resp: CTA bilaterally. No wheezing, rhonchi or rales.   GI: Soft, NT/ND. BSx4.  Extrem: No edema. Pulses intact.  Skin: No Jaundice.   Neuro: No focal deficits.   Psych: Normal mood and affect.     Lab Results   Component Value Date    WBC 4.9 03/18/2025    HGB 8.9 (L) 03/18/2025    HCT 29.5 (L) 03/18/2025    MCV 76.0 (L) 03/18/2025     03/18/2025       Chemistry    Lab Results   Component Value Date/Time     03/03/2025 0642    K 4.3 03/03/2025 0642     03/03/2025 0642    CO2 27 03/03/2025 0642    BUN 17 03/03/2025 0642    CREATININE 0.65 03/03/2025 0642    Lab Results   Component Value Date/Time    CALCIUM 9.4 03/03/2025 0642    ALKPHOS 67 03/03/2025 0642    AST 20 03/03/2025 0642    ALT 12 03/03/2025 0642    BILITOT 0.4 03/03/2025 0642             ASSESSMENT/PLAN  Rachel Mann is a 74 y.o. female with a PMH of HLD, osteoporosis, Mihaela-en-Y gastric bypass, RA who presents to clinic for iron deficiency anemia. Anemia may be multifactorial and related to her prior gastric bypass +/-GI blood loss.    - Obtain EGD/colonoscopy.  - Consider VCE pending clinical course.  - Continue to follow with hematology for iron infusions.    Follow up after rola Greenfield DO         [1]   Past Medical History:  Diagnosis Date    Arthritis 1982    Chronic pain disorder Ongoing    Extremity pain Nov 2022    Headache Young  adult    Headache, tension-type Unknown    Low back pain Ongoing    Peripheral neuropathy 2006   [2]   Past Surgical History:  Procedure Laterality Date    BACK SURGERY  1/2006    ENDOMETRIAL ABLATION  1995    HYSTERECTOMY  1995    LAMINECTOMY  1/2006    OTHER SURGICAL HISTORY  05/18/2019    Hysterectomy    OTHER SURGICAL HISTORY  05/18/2019    Back surgery    OTHER SURGICAL HISTORY  05/18/2019    Tonsillectomy    OTHER SURGICAL HISTORY  05/18/2019    Gastric bypass surgery    WISDOM TOOTH EXTRACTION  1974   [3] No Known Allergies

## 2025-06-27 ENCOUNTER — PHARMACY VISIT (OUTPATIENT)
Dept: PHARMACY | Facility: CLINIC | Age: 75
End: 2025-06-27
Payer: MEDICARE

## 2025-06-27 PROCEDURE — RXMED WILLOW AMBULATORY MEDICATION CHARGE

## 2025-06-27 RX ORDER — LEFLUNOMIDE 20 MG/1
20 TABLET ORAL DAILY
Qty: 30 TABLET | Refills: 2 | OUTPATIENT
Start: 2025-06-27

## 2025-06-27 RX ORDER — CELECOXIB 200 MG/1
200 CAPSULE ORAL DAILY
Qty: 30 CAPSULE | Refills: 3 | OUTPATIENT
Start: 2025-06-27

## 2025-07-03 ENCOUNTER — LAB (OUTPATIENT)
Dept: LAB | Facility: HOSPITAL | Age: 75
End: 2025-07-03
Payer: MEDICARE

## 2025-07-03 DIAGNOSIS — K90.9 INTESTINAL MALABSORPTION, UNSPECIFIED: Primary | ICD-10-CM

## 2025-07-03 DIAGNOSIS — D50.8 OTHER IRON DEFICIENCY ANEMIAS: ICD-10-CM

## 2025-07-03 LAB
ALBUMIN SERPL BCP-MCNC: 4.5 G/DL (ref 3.4–5)
ALP SERPL-CCNC: 65 U/L (ref 33–136)
ALT SERPL W P-5'-P-CCNC: 13 U/L (ref 7–45)
ANION GAP SERPL CALC-SCNC: 10 MMOL/L (ref 10–20)
AST SERPL W P-5'-P-CCNC: 17 U/L (ref 9–39)
BASOPHILS # BLD AUTO: 0.08 X10*3/UL (ref 0–0.1)
BASOPHILS NFR BLD AUTO: 2.6 %
BILIRUB SERPL-MCNC: 0.6 MG/DL (ref 0–1.2)
BUN SERPL-MCNC: 18 MG/DL (ref 6–23)
CALCIUM SERPL-MCNC: 9.4 MG/DL (ref 8.6–10.3)
CHLORIDE SERPL-SCNC: 105 MMOL/L (ref 98–107)
CO2 SERPL-SCNC: 28 MMOL/L (ref 21–32)
CREAT SERPL-MCNC: 0.65 MG/DL (ref 0.5–1.05)
EGFRCR SERPLBLD CKD-EPI 2021: >90 ML/MIN/1.73M*2
EOSINOPHIL # BLD AUTO: 0.21 X10*3/UL (ref 0–0.4)
EOSINOPHIL NFR BLD AUTO: 7 %
ERYTHROCYTE [DISTWIDTH] IN BLOOD BY AUTOMATED COUNT: 19.9 % (ref 11.5–14.5)
FERRITIN SERPL-MCNC: 89 NG/ML (ref 8–150)
GLUCOSE SERPL-MCNC: 95 MG/DL (ref 74–99)
HCT VFR BLD AUTO: 37.5 % (ref 36–46)
HGB BLD-MCNC: 12 G/DL (ref 12–16)
IMM GRANULOCYTES # BLD AUTO: 0 X10*3/UL (ref 0–0.5)
IMM GRANULOCYTES NFR BLD AUTO: 0 % (ref 0–0.9)
IRON SATN MFR SERPL: 30 % (ref 25–45)
IRON SERPL-MCNC: 101 UG/DL (ref 35–150)
LYMPHOCYTES # BLD AUTO: 1.31 X10*3/UL (ref 0.8–3)
LYMPHOCYTES NFR BLD AUTO: 43.4 %
MCH RBC QN AUTO: 28.3 PG (ref 26–34)
MCHC RBC AUTO-ENTMCNC: 32 G/DL (ref 32–36)
MCV RBC AUTO: 88 FL (ref 80–100)
MONOCYTES # BLD AUTO: 0.42 X10*3/UL (ref 0.05–0.8)
MONOCYTES NFR BLD AUTO: 13.9 %
NEUTROPHILS # BLD AUTO: 1 X10*3/UL (ref 1.6–5.5)
NEUTROPHILS NFR BLD AUTO: 33.1 %
NRBC BLD-RTO: 0 /100 WBCS (ref 0–0)
PLATELET # BLD AUTO: 155 X10*3/UL (ref 150–450)
POTASSIUM SERPL-SCNC: 4.4 MMOL/L (ref 3.5–5.3)
PROT SERPL-MCNC: 6.5 G/DL (ref 6.4–8.2)
RBC # BLD AUTO: 4.24 X10*6/UL (ref 4–5.2)
SODIUM SERPL-SCNC: 139 MMOL/L (ref 136–145)
TIBC SERPL-MCNC: 332 UG/DL (ref 240–445)
UIBC SERPL-MCNC: 231 UG/DL (ref 110–370)
WBC # BLD AUTO: 3 X10*3/UL (ref 4.4–11.3)

## 2025-07-03 PROCEDURE — 83550 IRON BINDING TEST: CPT

## 2025-07-03 PROCEDURE — 36415 COLL VENOUS BLD VENIPUNCTURE: CPT

## 2025-07-03 PROCEDURE — 82728 ASSAY OF FERRITIN: CPT

## 2025-07-03 PROCEDURE — 80053 COMPREHEN METABOLIC PANEL: CPT

## 2025-07-03 PROCEDURE — 85025 COMPLETE CBC W/AUTO DIFF WBC: CPT

## 2025-07-03 PROCEDURE — 83540 ASSAY OF IRON: CPT

## 2025-07-04 DIAGNOSIS — K90.9 IRON MALABSORPTION (HHS-HCC): ICD-10-CM

## 2025-07-04 DIAGNOSIS — D50.8 OTHER IRON DEFICIENCY ANEMIA: ICD-10-CM

## 2025-07-07 ENCOUNTER — OFFICE VISIT (OUTPATIENT)
Dept: HEMATOLOGY/ONCOLOGY | Facility: CLINIC | Age: 75
End: 2025-07-07
Payer: MEDICARE

## 2025-07-07 ENCOUNTER — APPOINTMENT (OUTPATIENT)
Dept: HEMATOLOGY/ONCOLOGY | Facility: CLINIC | Age: 75
End: 2025-07-07
Payer: MEDICARE

## 2025-07-07 VITALS
BODY MASS INDEX: 26.19 KG/M2 | SYSTOLIC BLOOD PRESSURE: 141 MMHG | WEIGHT: 152.56 LBS | RESPIRATION RATE: 16 BRPM | OXYGEN SATURATION: 96 % | DIASTOLIC BLOOD PRESSURE: 76 MMHG | HEART RATE: 78 BPM | TEMPERATURE: 97.2 F

## 2025-07-07 DIAGNOSIS — K90.9 IRON MALABSORPTION (HHS-HCC): Primary | ICD-10-CM

## 2025-07-07 DIAGNOSIS — D50.8 OTHER IRON DEFICIENCY ANEMIA: ICD-10-CM

## 2025-07-07 PROCEDURE — 1159F MED LIST DOCD IN RCRD: CPT

## 2025-07-07 PROCEDURE — 99214 OFFICE O/P EST MOD 30 MIN: CPT

## 2025-07-07 PROCEDURE — 1125F AMNT PAIN NOTED PAIN PRSNT: CPT

## 2025-07-07 ASSESSMENT — PAIN SCALES - GENERAL: PAINLEVEL_OUTOF10: 5

## 2025-07-07 NOTE — PROGRESS NOTES
Patient ID: Rachel Mann is a 74 y.o. female.  Referring Physician: Diana Nielsen MD  [address] [place] [address] [place] [address]  Primary Care Provider: Mat Tatum MD      Subjective    HPI  Rachel Mann is a 74 y.o. female presenting for initial consultation at the request of Dr. Mat Tatum for chronic anemia. Blood work on 3/18/25, shows CBC, Hb 8.9 g/dL with a low MCV of 76 fL. Looking back at labs, Hb has been in the 8 range since March 2025. Just prior, was normal in the 12 range since 2019 was 10.8 g/dL, no other cytopenias. Folate and B12 are unremarkable. Iron studies low on 3/3/25, iron saturation 5%, ferritin 9. No report of colonoscopy of EGD. She does have a history of rheumatoid arthritis    She presents today with her  and daughter. She reports feeling well with no major issues. She has been taking oral iron and has been off methotrexate and Plaquenil. She receives Simponi (Golimumab) injections every 8 weeks since June/July 2024. She denies abdominal pain, stool changes.     Interval History:   7/7/2025:    Patient presents for follow-up visit after completing a course of IV Venofer 300 mg on 4/8, 4/14, and 4/18/2025.  She reports that she tolerated infusions well.  She states that she is undergoing an active flare with her rheumatoid arthritis right now, she is having a lot of pain in her legs, arms, back, ankles and hands.  She says the pain is forward but that she is tolerating it and working with her team for management.     Patient says she is scheduled for her colonoscopy and EGD at the end of this month.  She has not noticed any signs of bleeding or blood loss.  She is not on oral iron.  She states that she has noticed her insomnia improved after the IV iron and her  has noticed she is not breathing hard with stairs.  She says her energy has always been decent because she pushes through symptoms.  She occasionally has a stomach ache with eating which is normal  since her bypass surgery.      Patient denies chest pain, heart palpitations, drenching night sweats, fevers, recent recurrent infections, melena, hematochezia, vaginal bleeding, hemoptysis, nausea that is new or worsening, vomiting or other concerns.  She denies lymphadenopathy.     Past surgical history:  Bariatric history,     Family history:  No history of blood cancers  Paternal aunt - colon cancer,  age 68/70 years      Review of Systems - Oncology   Review of Systems:    Positive per HPI, otherwise negative.       Objective   Visit Vitals  /76   Pulse 78   Temp 36.2 °C (97.2 °F)   Resp 16   Wt 69.2 kg (152 lb 8.9 oz)   SpO2 96%   BMI 26.19 kg/m²   OB Status Postmenopausal   Smoking Status Never   BSA 1.77 m²       Family History   Problem Relation Name Age of Onset    Arthritis Mother Sylvia Erickson     Diabetes Mother Sylvia Erickson     Stroke Mother Sylvia Erickson     Osteoporosis Mother Sylvia Erickson     Alcohol abuse Father ERIK Erickson     COPD Father ERIK Erickson     Colon cancer Father's Sister         Physical Exam  Gen: appears well in clinic, NAD  HEENT: atraumatic head, normocephalic, EOMI, conjunctiva normal  LUNG: no increased WOB, CTAB  CV: No JVD. RRR  GI: soft, NT, ND  LE: no LE edema  Skin: no obvious rashes or lesions on visible skin  Neuro: interactive, no focal deficits noted  Psych: normal mood and affect      Performance Status:  Symptomatic; fully ambulatory    Labs/Imaging/Pathology: Personally reviewed reports and images in Epic electronic medical record system. Pertinent results as it related to the plan represented in below in assessment and plan.     Labs:  Lab Results   Component Value Date    WBC 3.0 (L) 2025    NEUTROABS 1.00 (L) 2025    IGABSOL 0.00 2025    LYMPHSABS 1.31 2025    MONOSABS 0.42 2025    EOSABS 0.21 2025    BASOSABS 0.08 2025    RBC 4.24 2025    MCV 88 2025    MCHC 32.0 2025    HGB 12.0 2025    HCT 37.5  07/03/2025     07/03/2025     Lab Results   Component Value Date    CREATININE 0.65 07/03/2025    BUN 18 07/03/2025    EGFR >90 07/03/2025     07/03/2025    K 4.4 07/03/2025     07/03/2025    CO2 28 07/03/2025      Lab Results   Component Value Date    ALT 13 07/03/2025    AST 17 07/03/2025    ALKPHOS 65 07/03/2025    BILITOT 0.6 07/03/2025        Assessment/Plan    Iron deficiency anemia   - Today we discussed that iron deficiency anemia is likely multifactorial   - Given history of bariatric surgery years ago, likely contributing to iron malabsorption   - Also has rheumatoid arthritis and there is likely a component of anemia of chronic disease   - However, given persistent iron deficiency anemia and no recent GI workup would recommend colonoscopy and EGD to rule out occult GI blood loss   - Has GI follow up in June   - We did discuss that a primary bone marrow pathology is also in the differential   - However, would assess if iron is replete and continues to be anemic, would plan for a round of IV venofer and follow up in 3 months   - She denies stool changes, weight loss, night sweats, fever   - No lymphadenopathy or splenomegaly on exam today   - RTC in 3 month with repeat labs including iron studies    7/7/2025:   - Patient presents for follow-up visit for iron deficiency anemia accompanied by her   - Labs done on 7/3/25 show WBC 3.0, hgb 12.0, MCV 88, plt 155,000, ANC 1,000, iron sat 30%, ferritin 89  - She completed a course of IV venofer 300 mg on 4/8, 4/14 and 4/18/25   - We discussed that her iron studies are normal and her hemoglobin is within normal range as well   - Saw GI who has ordered colonoscopy and EGD -scheduled for 7/31/25  - She denies new or worsening GI symptoms, signs of bleeding and constitutional symptoms  -She has neutropenia noted on her labs but is undergoing an active and intense RA flare at this time  -She tolerated the IV Venofer well and has noticed some  improvement in her symptoms  -She is not on oral iron  - She will return in 3 months for virtual visit after obtaining repeat labs to reassess her iron studies at that time and follow-up on her GI workup in addition to rechecking her neutrophil count  - She was advised to call with any new or worsening symptoms including symptoms of anemia or signs of bleeding in the meantime      Diagnoses and all orders for this visit:  Iron malabsorption (HHS-HCC)  -     Clinic Appointment Request Virtual Est; [facility]; Future  -     CBC and Auto Differential; Future  -     Comprehensive Metabolic Panel; Future  -     Ferritin; Future  -     Iron and TIBC; Future  -     Clinic Appointment Request  Other iron deficiency anemia  -     Clinic Appointment Request Virtual Est; [facility]; Future  -     CBC and Auto Differential; Future  -     Comprehensive Metabolic Panel; Future  -     Ferritin; Future  -     Iron and TIBC; Future  -     Clinic Appointment Request      ARTEM Parker-CNP

## 2025-07-17 PROCEDURE — RXMED WILLOW AMBULATORY MEDICATION CHARGE

## 2025-07-21 ENCOUNTER — ANESTHESIA EVENT (OUTPATIENT)
Dept: GASTROENTEROLOGY | Facility: EXTERNAL LOCATION | Age: 75
End: 2025-07-21

## 2025-07-22 ENCOUNTER — PHARMACY VISIT (OUTPATIENT)
Dept: PHARMACY | Facility: CLINIC | Age: 75
End: 2025-07-22
Payer: MEDICARE

## 2025-07-29 ENCOUNTER — PHARMACY VISIT (OUTPATIENT)
Dept: PHARMACY | Facility: CLINIC | Age: 75
End: 2025-07-29
Payer: MEDICARE

## 2025-07-29 PROCEDURE — RXMED WILLOW AMBULATORY MEDICATION CHARGE

## 2025-07-30 ENCOUNTER — PHARMACY VISIT (OUTPATIENT)
Dept: PHARMACY | Facility: CLINIC | Age: 75
End: 2025-07-30
Payer: MEDICARE

## 2025-07-31 ENCOUNTER — APPOINTMENT (OUTPATIENT)
Dept: GASTROENTEROLOGY | Facility: EXTERNAL LOCATION | Age: 75
End: 2025-07-31
Payer: MEDICARE

## 2025-07-31 ENCOUNTER — ANESTHESIA (OUTPATIENT)
Dept: GASTROENTEROLOGY | Facility: EXTERNAL LOCATION | Age: 75
End: 2025-07-31

## 2025-07-31 VITALS
DIASTOLIC BLOOD PRESSURE: 65 MMHG | HEART RATE: 84 BPM | SYSTOLIC BLOOD PRESSURE: 122 MMHG | WEIGHT: 152 LBS | OXYGEN SATURATION: 99 % | RESPIRATION RATE: 17 BRPM | BODY MASS INDEX: 25.95 KG/M2 | TEMPERATURE: 97.3 F | HEIGHT: 64 IN

## 2025-07-31 DIAGNOSIS — D50.9 IRON DEFICIENCY ANEMIA, UNSPECIFIED IRON DEFICIENCY ANEMIA TYPE: ICD-10-CM

## 2025-07-31 DIAGNOSIS — D64.9 CHRONIC ANEMIA: ICD-10-CM

## 2025-07-31 PROCEDURE — 43239 EGD BIOPSY SINGLE/MULTIPLE: CPT | Performed by: STUDENT IN AN ORGANIZED HEALTH CARE EDUCATION/TRAINING PROGRAM

## 2025-07-31 PROCEDURE — 45385 COLONOSCOPY W/LESION REMOVAL: CPT | Performed by: STUDENT IN AN ORGANIZED HEALTH CARE EDUCATION/TRAINING PROGRAM

## 2025-07-31 RX ORDER — PROPOFOL 10 MG/ML
INJECTION, EMULSION INTRAVENOUS AS NEEDED
Status: DISCONTINUED | OUTPATIENT
Start: 2025-07-31 | End: 2025-07-31

## 2025-07-31 RX ORDER — SODIUM CHLORIDE 9 MG/ML
INJECTION, SOLUTION INTRAVENOUS CONTINUOUS PRN
Status: DISCONTINUED | OUTPATIENT
Start: 2025-07-31 | End: 2025-07-31

## 2025-07-31 RX ORDER — LIDOCAINE HYDROCHLORIDE 20 MG/ML
INJECTION, SOLUTION INFILTRATION; PERINEURAL AS NEEDED
Status: DISCONTINUED | OUTPATIENT
Start: 2025-07-31 | End: 2025-07-31

## 2025-07-31 RX ADMIN — SODIUM CHLORIDE: 9 INJECTION, SOLUTION INTRAVENOUS at 07:20

## 2025-07-31 RX ADMIN — PROPOFOL 20 MG: 10 INJECTION, EMULSION INTRAVENOUS at 07:54

## 2025-07-31 RX ADMIN — PROPOFOL 100 MG: 10 INJECTION, EMULSION INTRAVENOUS at 07:29

## 2025-07-31 RX ADMIN — LIDOCAINE HYDROCHLORIDE 3 ML: 20 INJECTION, SOLUTION INFILTRATION; PERINEURAL at 07:29

## 2025-07-31 RX ADMIN — PROPOFOL 30 MG: 10 INJECTION, EMULSION INTRAVENOUS at 07:50

## 2025-07-31 RX ADMIN — PROPOFOL 50 MG: 10 INJECTION, EMULSION INTRAVENOUS at 07:36

## 2025-07-31 RX ADMIN — PROPOFOL 50 MG: 10 INJECTION, EMULSION INTRAVENOUS at 07:44

## 2025-07-31 SDOH — HEALTH STABILITY: MENTAL HEALTH: CURRENT SMOKER: 0

## 2025-07-31 ASSESSMENT — PAIN - FUNCTIONAL ASSESSMENT
PAIN_FUNCTIONAL_ASSESSMENT: 0-10

## 2025-07-31 ASSESSMENT — PAIN SCALES - GENERAL
PAINLEVEL_OUTOF10: 0 - NO PAIN
PAINLEVEL_OUTOF10: 0 - NO PAIN
PAIN_LEVEL: 0
PAINLEVEL_OUTOF10: 0 - NO PAIN
PAINLEVEL_OUTOF10: 0 - NO PAIN

## 2025-07-31 NOTE — ANESTHESIA PREPROCEDURE EVALUATION
Patient: Rachel Mann    Procedure Information       Date/Time: 25 0730    Scheduled providers: Homer Greenfield DO    Procedures:       EGD      COLONOSCOPY    Location: [facility]            Relevant Problems   Neuro   (+) Axonal sensorimotor neuropathy   (+) Entrapment neuropathy of common peroneal nerve   (+) Lumbar radiculopathy   (+) Peripheral neuropathy, idiopathic   (+) Piriformis syndrome, right   (+) Right lumbar radiculopathy      Hematology   (+) Anemia   (+) Chronic anemia   (+) Iron deficiency anemia   (+) Postoperative anemia due to acute blood loss      Musculoskeletal   (+) Chronic bilateral low back pain with sciatica   (+) Chronic bilateral low back pain without sciatica   (+) Rheumatoid arthritis   (+) Spinal stenosis of lumbar region with neurogenic claudication      HEENT   (+) Acute non-recurrent maxillary sinusitis      ID   (+) Upper respiratory tract infection due to COVID-19 virus      Skin   (+) Pruritic erythematous rash       Clinical information reviewed:   Tobacco  Allergies  Meds   Med Hx  Surg Hx  OB Status  Fam Hx  Soc   Hx        NPO Detail:  NPO/Void Status  Carbohydrate Drink Given Prior to Surgery? : N  Date of Last Liquid: 25  Time of Last Liquid:   Date of Last Solid: 25  Time of Last Solid:   Last Intake Type: Clear fluids  Time of Last Void: 716         Physical Exam    Airway  Mallampati: II  TM distance: >3 FB  Neck ROM: full  Mouth openin finger widths     Cardiovascular - normal exam  Rhythm: regular  Rate: normal     Dental - normal exam     Pulmonary - normal examBreath sounds clear to auscultation     Abdominal - normal exam           Anesthesia Plan    History of general anesthesia?: yes  History of complications of general anesthesia?: no    ASA 2     MAC     The patient is not a current smoker.    intravenous induction   Anesthetic plan and risks discussed with patient.    Plan discussed with  CRNA.

## 2025-07-31 NOTE — H&P
Outpatient NR Procedure H&P    Patient Profile  Name Rachel Mann  Date of Birth 1950  MRN [id number]  PCP Mat Tatum        Diagnosis: Iron deficiency anemia.  Procedure(s):  EGD/Colonoscopy.    Allergies  RX Allergies[1]    Past Medical History   Medical History[2]    Medication Reviewed - yes  Prior to Admission medications   Medication Sig Start Date End Date Taking? Authorizing Provider   b complex vitamins capsule Vitamin B Complex CAPS   Refills: 0       Active    Historical Provider, MD   calcium 500 mg calcium (1,250 mg) tablet Calcium 500 MG TABS   Refills: 0       Active    Historical Provider, MD   celecoxib (CeleBREX) 200 mg capsule Take 1 capsule (200 mg) by mouth once daily with food. 6/27/25      cholecalciferol (Vitamin D-3) 50 mcg (2,000 unit) capsule Vitamin D3 50 MCG (2000 UT) Oral Capsule   Refills: 0       Active    Historical Provider, MD   famotidine (Pepcid) 40 mg tablet Take 1 tablet (40 mg) by mouth once daily at bedtime. 5/28/25      folic acid (Folvite) 1 mg tablet Take 1 tablet (1 mg) by mouth once daily. 4/19/24      folic acid (Folvite) 1 mg tablet Take 1 tablet (1 mg) by mouth once daily. 4/11/25      golimumab (SIMPONI ARIA IV) Infuse into a venous catheter.    Historical Provider, MD   hydroxychloroquine (Plaquenil) 200 mg tablet Take 2 tablets (400 mg) by mouth once daily. 4/9/25      hydroxychloroquine (Plaquenil) 200 mg tablet Take 2 tablets (400 mg) by mouth once daily with food. 4/9/25      leflunomide (Arava) 20 mg tablet Take 1 tablet (20 mg) by mouth once daily. 6/27/25      leflunomide (Arava) 20 mg tablet Take 1 tablet (20 mg) by mouth once daily. 6/27/25      magnesium 250 mg tablet Take 1 tablet (250 mg) by mouth once daily.    Historical Provider, MD   methotrexate (Trexall) 2.5 mg tablet Take 5 tablets (12.5 mg total) by mouth 1 (one) time per week. 4/13/25      methylPREDNISolone (Medrol, Elmer,) 4 mg tablets Take by mouth as directed per package instructions.  6/9/25      pregabalin (Lyrica) 75 mg capsule Take 1 capsule (75 mg) by mouth 2 times a day. 7/16/25      sulfaSALAzine (Azulfidine) 500 mg tablet Take 1 tablet (500 mg) by mouth 2 times a day for 7 days, THEN increase to 2 tablets (1,000 mg) 2 times a day if no side effects. 6/7/24 6/16/25     sulfaSALAzine (Azulfidine) 500 mg tablet Take 2 tablets (1,000 mg) by mouth 2 times a day. 5/9/25      traMADol (Ultram) 50 mg tablet Take 1 tablet (50 mg) by mouth 2 times a day as needed. 7/16/25      vit C-Zn gluc-herbal no.325 (Elderberry Zinc Vit C) 90-15 mg lozenge Take 1 daily    Historical Provider, MD   zoledronic acid (Reclast) 5 mg/100 mL piggyback INFUSE 5MG INTRAVENOUSLY OVER 15 MINUTES AS DIRECTED. 8/7/23   Mat Tatum MD       Physical Exam  Vitals:    07/31/25 0715   BP: 141/77   Pulse: 97   Resp: 20   Temp: 36.4 °C (97.5 °F)   SpO2: 97%      Weight   Vitals:    07/31/25 0715   Weight: 68.9 kg (152 lb)     BMI Body mass index is 26.09 kg/m².    General: A&Ox3, NAD.  HEENT: AT/NC.   CV: RRR.   Resp: CTA bilaterally. No wheezing, rhonchi or rales.   GI: Soft, NT/ND.  Extrem: No edema. Pulses intact.  Skin: No Jaundice.   Neuro: No focal deficits.   Psych: Normal mood and affect.        Oropharyngeal Classification II (hard and soft palate, upper portion of tonsils and uvula visible)  ASA PS Classification 2  Sedation Plan: Deep Sedation.  Procedure Plan - pre-procedural (re)assesment completed by physician:  discharge/transfer patient when discharge criteria met    Homer Greenfield DO  7/31/2025 7:22 AM           [1] No Known Allergies  [2]   Past Medical History:  Diagnosis Date    Anemia     Arthritis 1982    Chronic pain disorder Ongoing    Extremity pain Nov 2022    Headache Young adult    Headache, tension-type Unknown    Low back pain Ongoing    Peripheral neuropathy 2006

## 2025-07-31 NOTE — DISCHARGE INSTRUCTIONS

## 2025-07-31 NOTE — ANESTHESIA POSTPROCEDURE EVALUATION
Patient: Rachel Mann    Procedure Summary       Date: 07/31/25 Room / Location: [facility]    Anesthesia Start: 0725 Anesthesia Stop: 0800    Procedures:       EGD      COLONOSCOPY Diagnosis:       Chronic anemia      Iron deficiency anemia, unspecified iron deficiency anemia type    Scheduled Providers: Homer Greenfield DO Responsible Provider: ANTONIO Johnson    Anesthesia Type: MAC ASA Status: 2            Anesthesia Type: MAC    Vitals Value Taken Time   /73 07/31/25 07:58   Temp 36.3 °C (97.3 °F) 07/31/25 07:58   Pulse 78 07/31/25 07:58   Resp 18 07/31/25 07:58   SpO2 98 % 07/31/25 07:58       Anesthesia Post Evaluation    Patient location during evaluation: bedside  Patient participation: complete - patient participated  Level of consciousness: awake  Pain score: 0  Pain management: adequate  Airway patency: patent  Cardiovascular status: acceptable  Respiratory status: acceptable  Hydration status: acceptable  Postoperative Nausea and Vomiting: none        There were no known notable events for this encounter.

## 2025-08-06 PROCEDURE — RXMED WILLOW AMBULATORY MEDICATION CHARGE

## 2025-08-07 ENCOUNTER — PATIENT MESSAGE (OUTPATIENT)
Dept: PRIMARY CARE | Facility: CLINIC | Age: 75
End: 2025-08-07
Payer: MEDICARE

## 2025-08-08 DIAGNOSIS — M81.0 AGE-RELATED OSTEOPOROSIS WITHOUT CURRENT PATHOLOGICAL FRACTURE: Primary | ICD-10-CM

## 2025-08-08 RX ORDER — DIPHENHYDRAMINE HYDROCHLORIDE 50 MG/ML
50 INJECTION, SOLUTION INTRAMUSCULAR; INTRAVENOUS AS NEEDED
Status: CANCELLED | OUTPATIENT
Start: 2025-08-08

## 2025-08-08 RX ORDER — DIPHENHYDRAMINE HYDROCHLORIDE 50 MG/ML
50 INJECTION, SOLUTION INTRAMUSCULAR; INTRAVENOUS AS NEEDED
OUTPATIENT
Start: 2025-08-08

## 2025-08-08 RX ORDER — ALBUTEROL SULFATE 0.83 MG/ML
3 SOLUTION RESPIRATORY (INHALATION) AS NEEDED
OUTPATIENT
Start: 2025-08-08

## 2025-08-08 RX ORDER — ZOLEDRONIC ACID 5 MG/100ML
5 INJECTION, SOLUTION INTRAVENOUS ONCE
OUTPATIENT
Start: 2025-08-08

## 2025-08-08 RX ORDER — EPINEPHRINE 0.3 MG/.3ML
0.3 INJECTION SUBCUTANEOUS EVERY 5 MIN PRN
OUTPATIENT
Start: 2025-08-08

## 2025-08-08 RX ORDER — FAMOTIDINE 10 MG/ML
20 INJECTION, SOLUTION INTRAVENOUS ONCE AS NEEDED
Status: CANCELLED | OUTPATIENT
Start: 2025-08-08

## 2025-08-08 RX ORDER — ALBUTEROL SULFATE 0.83 MG/ML
3 SOLUTION RESPIRATORY (INHALATION) AS NEEDED
Status: CANCELLED | OUTPATIENT
Start: 2025-08-08

## 2025-08-08 RX ORDER — FAMOTIDINE 10 MG/ML
20 INJECTION, SOLUTION INTRAVENOUS ONCE AS NEEDED
OUTPATIENT
Start: 2025-08-08

## 2025-08-08 RX ORDER — EPINEPHRINE 0.3 MG/.3ML
0.3 INJECTION SUBCUTANEOUS EVERY 5 MIN PRN
Status: CANCELLED | OUTPATIENT
Start: 2025-08-08

## 2025-08-11 LAB
LAB AP ASR DISCLAIMER: NORMAL
LABORATORY COMMENT REPORT: NORMAL
PATH REPORT.FINAL DX SPEC: NORMAL
PATH REPORT.GROSS SPEC: NORMAL
PATH REPORT.TOTAL CANCER: NORMAL

## 2025-08-12 ENCOUNTER — PHARMACY VISIT (OUTPATIENT)
Dept: PHARMACY | Facility: CLINIC | Age: 75
End: 2025-08-12
Payer: MEDICARE

## 2025-08-22 PROCEDURE — RXMED WILLOW AMBULATORY MEDICATION CHARGE

## 2025-08-23 ENCOUNTER — PHARMACY VISIT (OUTPATIENT)
Dept: PHARMACY | Facility: CLINIC | Age: 75
End: 2025-08-23
Payer: MEDICARE

## 2025-08-26 ENCOUNTER — PHARMACY VISIT (OUTPATIENT)
Dept: PHARMACY | Facility: CLINIC | Age: 75
End: 2025-08-26
Payer: MEDICARE

## 2025-08-26 PROCEDURE — RXMED WILLOW AMBULATORY MEDICATION CHARGE

## 2025-08-26 RX ORDER — METHYLPREDNISOLONE 4 MG/1
TABLET ORAL
Qty: 21 TABLET | Refills: 0 | OUTPATIENT
Start: 2025-08-26

## 2025-08-30 PROCEDURE — RXMED WILLOW AMBULATORY MEDICATION CHARGE

## 2025-09-02 ENCOUNTER — PHARMACY VISIT (OUTPATIENT)
Dept: PHARMACY | Facility: CLINIC | Age: 75
End: 2025-09-02
Payer: MEDICARE